# Patient Record
Sex: MALE | Race: BLACK OR AFRICAN AMERICAN | HISPANIC OR LATINO | Employment: OTHER | URBAN - METROPOLITAN AREA
[De-identification: names, ages, dates, MRNs, and addresses within clinical notes are randomized per-mention and may not be internally consistent; named-entity substitution may affect disease eponyms.]

---

## 2018-09-12 ENCOUNTER — OFFICE VISIT (OUTPATIENT)
Dept: FAMILY MEDICINE CLINIC | Facility: CLINIC | Age: 43
End: 2018-09-12
Payer: COMMERCIAL

## 2018-09-12 VITALS
SYSTOLIC BLOOD PRESSURE: 118 MMHG | HEART RATE: 83 BPM | TEMPERATURE: 97.6 F | OXYGEN SATURATION: 98 % | DIASTOLIC BLOOD PRESSURE: 80 MMHG | RESPIRATION RATE: 19 BRPM | WEIGHT: 148 LBS

## 2018-09-12 DIAGNOSIS — M75.102 TEAR OF LEFT ROTATOR CUFF, UNSPECIFIED TEAR EXTENT: Primary | ICD-10-CM

## 2018-09-12 PROBLEM — F41.9 ANXIETY: Status: ACTIVE | Noted: 2018-01-31

## 2018-09-12 PROBLEM — M25.512 ACUTE PAIN OF LEFT SHOULDER: Status: ACTIVE | Noted: 2018-03-13

## 2018-09-12 PROBLEM — M75.02 ADHESIVE CAPSULITIS OF LEFT SHOULDER: Status: ACTIVE | Noted: 2018-03-13

## 2018-09-12 PROBLEM — F32.A DEPRESSION: Status: ACTIVE | Noted: 2018-01-31

## 2018-09-12 PROCEDURE — 3725F SCREEN DEPRESSION PERFORMED: CPT | Performed by: FAMILY MEDICINE

## 2018-09-12 PROCEDURE — 99213 OFFICE O/P EST LOW 20 MIN: CPT | Performed by: FAMILY MEDICINE

## 2018-09-12 RX ORDER — PHENYTOIN 125 MG/5ML
SUSPENSION ORAL 3 TIMES DAILY
COMMUNITY
End: 2018-09-14 | Stop reason: CLARIF

## 2018-09-12 RX ORDER — ESCITALOPRAM OXALATE 20 MG/1
TABLET ORAL EVERY 24 HOURS
COMMUNITY

## 2018-09-12 NOTE — PROGRESS NOTES
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        There are no diagnoses linked to this encounter  Subjective:      Patient ID: Catrina Joe is a 44 y o  male  This is a very pleasant 42-year-old gentleman with a significant past medical history of seizures, depression, anxiety  Patient is being seen in the office for evaluation of left shoulder status post having surgery for his rotator cuff  At this time patient has significantly  Limited range of motion as well as pain when utilizing his left shoulder  I reviewed notes from his surgeon  Foster Marvin,   Who states   It is too early to do any heavy lifting  Given the nature of this patient's job, there is no opportunity for light duty  So I think would be appropriate to follow the recommendation of his orthopedic surgeon and hold off on working at this time, continue physical therapy and other recommendations by Orthopedics  The following portions of the patient's history were reviewed and updated as appropriate: allergies, current medications, past family history, past medical history, past social history, past surgical history and problem list     Review of Systems   Constitutional: Negative for chills and fever  HENT: Negative for congestion and sore throat  Eyes: Negative for visual disturbance  Respiratory: Negative for shortness of breath and wheezing  Cardiovascular: Negative for chest pain and palpitations  Gastrointestinal: Negative for nausea and vomiting  Endocrine: Negative for polydipsia and polyphagia  Genitourinary: Negative for discharge and dysuria  Musculoskeletal: Negative for arthralgias and myalgias  Decreased ROM of left shoulder  - tenderness on ROM activity     Restricted active and passive ROM   Skin: Negative for rash and wound     Neurological: Negative for syncope and facial asymmetry  Psychiatric/Behavioral: Negative for agitation  Objective:      /80 (BP Location: Left arm, Patient Position: Sitting, Cuff Size: Adult)   Pulse 83   Temp 97 6 °F (36 4 °C) (Temporal)   Resp 19   Wt 67 1 kg (148 lb)   SpO2 98%          Physical Exam   Constitutional: He is oriented to person, place, and time  He appears well-developed and well-nourished  No distress  HENT:   Head: Normocephalic and atraumatic  Nose: Nose normal    Mouth/Throat: Oropharynx is clear and moist    Eyes: Conjunctivae and EOM are normal  Pupils are equal, round, and reactive to light  Right eye exhibits no discharge  Left eye exhibits no discharge  Neck: Normal range of motion  No JVD present  No tracheal deviation present  No thyromegaly present  Cardiovascular: Normal rate, regular rhythm, normal heart sounds and intact distal pulses  Exam reveals no gallop and no friction rub  No murmur heard  Pulmonary/Chest: Effort normal and breath sounds normal  No respiratory distress  He has no wheezes  He exhibits no tenderness  Abdominal: He exhibits no distension and no mass  There is no tenderness  There is no rebound and no guarding  Musculoskeletal: He exhibits tenderness  He exhibits no edema or deformity  Decreased ROM of left shoulder  - Tenderness on ROM activity   - restricted active and passive ROM   Neurological: He is alert and oriented to person, place, and time  Skin: Skin is warm and dry  No rash noted  He is not diaphoretic  No erythema  No pallor  Psychiatric: He has a normal mood and affect   His behavior is normal  Judgment and thought content normal

## 2018-09-12 NOTE — ASSESSMENT & PLAN NOTE
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

## 2018-09-14 ENCOUNTER — APPOINTMENT (EMERGENCY)
Dept: RADIOLOGY | Facility: HOSPITAL | Age: 43
DRG: 053 | End: 2018-09-14
Payer: COMMERCIAL

## 2018-09-14 ENCOUNTER — HOSPITAL ENCOUNTER (INPATIENT)
Facility: HOSPITAL | Age: 43
LOS: 3 days | Discharge: HOME | DRG: 053 | End: 2018-09-17
Attending: EMERGENCY MEDICINE | Admitting: FAMILY MEDICINE
Payer: COMMERCIAL

## 2018-09-14 ENCOUNTER — APPOINTMENT (INPATIENT)
Dept: NON INVASIVE DIAGNOSTICS | Facility: HOSPITAL | Age: 43
DRG: 053 | End: 2018-09-14
Payer: COMMERCIAL

## 2018-09-14 DIAGNOSIS — Z91.14 NONCOMPLIANCE WITH MEDICATION REGIMEN: ICD-10-CM

## 2018-09-14 DIAGNOSIS — N17.9 AKI (ACUTE KIDNEY INJURY) (HCC): ICD-10-CM

## 2018-09-14 DIAGNOSIS — R10.9 ABDOMINAL PAIN: ICD-10-CM

## 2018-09-14 DIAGNOSIS — F14.10 COCAINE ABUSE (HCC): ICD-10-CM

## 2018-09-14 DIAGNOSIS — R56.9 SEIZURE (HCC): Primary | ICD-10-CM

## 2018-09-14 PROBLEM — F19.10 DRUG ABUSE (HCC): Status: ACTIVE | Noted: 2018-09-14

## 2018-09-14 PROBLEM — D72.829 LEUKOCYTOSIS: Status: ACTIVE | Noted: 2018-09-14

## 2018-09-14 PROBLEM — F31.9 BIPOLAR DEPRESSION (HCC): Status: ACTIVE | Noted: 2018-09-14

## 2018-09-14 LAB
ALBUMIN SERPL BCP-MCNC: 3.5 G/DL (ref 3.5–5)
ALP SERPL-CCNC: 89 U/L (ref 46–116)
ALT SERPL W P-5'-P-CCNC: 23 U/L (ref 12–78)
AMPHETAMINES SERPL QL SCN: NEGATIVE
APAP SERPL-MCNC: <2 UG/ML (ref 10–30)
AST SERPL W P-5'-P-CCNC: 20 U/L (ref 5–45)
ATRIAL RATE: 112 BPM
BACTERIA UR QL AUTO: ABNORMAL /HPF
BARBITURATES UR QL: NEGATIVE
BASE EX.OXY STD BLDV CALC-SCNC: 77.9 % (ref 60–80)
BASE EXCESS BLDV CALC-SCNC: -8.3 MMOL/L
BASOPHILS # BLD AUTO: 0.17 THOUSANDS/ΜL (ref 0–0.1)
BASOPHILS NFR BLD AUTO: 1 % (ref 0–1)
BENZODIAZ UR QL: POSITIVE
BILIRUB DIRECT SERPL-MCNC: 0.14 MG/DL (ref 0–0.2)
BILIRUB SERPL-MCNC: 0.39 MG/DL (ref 0.2–1)
BILIRUB UR QL STRIP: NEGATIVE
BUN SERPL-MCNC: 13 MG/DL (ref 5–25)
CALCIUM SERPL-MCNC: 9.6 MG/DL (ref 8.3–10.1)
CHLORIDE SERPL-SCNC: 101 MMOL/L (ref 100–108)
CK MB SERPL-MCNC: 1.9 NG/ML (ref 0–5)
CK MB SERPL-MCNC: <1 % (ref 0–2.5)
CK SERPL-CCNC: 373 U/L (ref 39–308)
CLARITY UR: CLEAR
CO2 SERPL-SCNC: <5 MMOL/L (ref 21–32)
COCAINE UR QL: POSITIVE
COLOR UR: YELLOW
COLOR, POC: YELLOW
CREAT SERPL-MCNC: 2 MG/DL (ref 0.6–1.3)
EOSINOPHIL # BLD AUTO: 0.69 THOUSAND/ΜL (ref 0–0.61)
EOSINOPHIL NFR BLD AUTO: 4 % (ref 0–6)
ERYTHROCYTE [DISTWIDTH] IN BLOOD BY AUTOMATED COUNT: 12.9 % (ref 11.6–15.1)
ETHANOL SERPL-MCNC: <3 MG/DL (ref 0–3)
ETHANOL SERPL-MCNC: <3 MG/DL (ref 0–3)
GFR SERPL CREATININE-BSD FRML MDRD: 40 ML/MIN/1.73SQ M
GLUCOSE SERPL-MCNC: 262 MG/DL (ref 65–140)
GLUCOSE UR STRIP-MCNC: ABNORMAL MG/DL
HCO3 BLDV-SCNC: 17 MMOL/L (ref 24–30)
HCT VFR BLD AUTO: 57.9 % (ref 36.5–49.3)
HGB BLD-MCNC: 17.5 G/DL (ref 12–17)
HGB UR QL STRIP.AUTO: ABNORMAL
HYALINE CASTS #/AREA URNS LPF: ABNORMAL /LPF
IMM GRANULOCYTES # BLD AUTO: 0.45 THOUSAND/UL (ref 0–0.2)
IMM GRANULOCYTES NFR BLD AUTO: 2 % (ref 0–2)
KETONES UR STRIP-MCNC: NEGATIVE MG/DL
LACTATE SERPL-SCNC: 2.2 MMOL/L (ref 0.5–2)
LACTATE SERPL-SCNC: 6.2 MMOL/L (ref 0.5–2)
LEUKOCYTE ESTERASE UR QL STRIP: NEGATIVE
LIPASE SERPL-CCNC: 207 U/L (ref 73–393)
LYMPHOCYTES # BLD AUTO: 7.37 THOUSANDS/ΜL (ref 0.6–4.47)
LYMPHOCYTES NFR BLD AUTO: 37 % (ref 14–44)
MCH RBC QN AUTO: 28.5 PG (ref 26.8–34.3)
MCHC RBC AUTO-ENTMCNC: 30.2 G/DL (ref 31.4–37.4)
MCV RBC AUTO: 94 FL (ref 82–98)
METHADONE UR QL: NEGATIVE
MONOCYTES # BLD AUTO: 1.07 THOUSAND/ΜL (ref 0.17–1.22)
MONOCYTES NFR BLD AUTO: 5 % (ref 4–12)
NEUTROPHILS # BLD AUTO: 10.1 THOUSANDS/ΜL (ref 1.85–7.62)
NEUTS SEG NFR BLD AUTO: 51 % (ref 43–75)
NITRITE UR QL STRIP: NEGATIVE
NON-SQ EPI CELLS URNS QL MICRO: ABNORMAL /HPF
NRBC BLD AUTO-RTO: 0 /100 WBCS
O2 CT BLDV-SCNC: 19 ML/DL
OPIATES UR QL SCN: NEGATIVE
P AXIS: 64 DEGREES
PCO2 BLDV: 35.4 MM HG (ref 42–50)
PCP UR QL: NEGATIVE
PH BLDV: 7.3 [PH] (ref 7.3–7.4)
PH UR STRIP.AUTO: 5.5 [PH] (ref 4.5–8)
PHENYTOIN SERPL-MCNC: <0.4 UG/ML (ref 10–20)
PLATELET # BLD AUTO: 364 THOUSANDS/UL (ref 149–390)
PMV BLD AUTO: 11.1 FL (ref 8.9–12.7)
PO2 BLDV: 48.4 MM HG (ref 35–45)
POTASSIUM SERPL-SCNC: 4.5 MMOL/L (ref 3.5–5.3)
PR INTERVAL: 136 MS
PROT SERPL-MCNC: 6.5 G/DL (ref 6.4–8.2)
PROT UR STRIP-MCNC: ABNORMAL MG/DL
QRS AXIS: 78 DEGREES
QRSD INTERVAL: 86 MS
QT INTERVAL: 302 MS
QTC INTERVAL: 412 MS
RBC # BLD AUTO: 6.15 MILLION/UL (ref 3.88–5.62)
RBC #/AREA URNS AUTO: ABNORMAL /HPF
SALICYLATES SERPL-MCNC: <3 MG/DL (ref 3–20)
SODIUM SERPL-SCNC: 136 MMOL/L (ref 136–145)
SP GR UR STRIP.AUTO: 1.02 (ref 1–1.03)
T WAVE AXIS: 31 DEGREES
THC UR QL: POSITIVE
TROPONIN I SERPL-MCNC: 0.06 NG/ML
TROPONIN I SERPL-MCNC: 0.1 NG/ML
TSH SERPL DL<=0.05 MIU/L-ACNC: 0.33 UIU/ML (ref 0.36–3.74)
UROBILINOGEN UR QL STRIP.AUTO: 0.2 E.U./DL
VENTRICULAR RATE: 112 BPM
WBC # BLD AUTO: 19.85 THOUSAND/UL (ref 4.31–10.16)
WBC #/AREA URNS AUTO: ABNORMAL /HPF

## 2018-09-14 PROCEDURE — 82550 ASSAY OF CK (CPK): CPT | Performed by: FAMILY MEDICINE

## 2018-09-14 PROCEDURE — 93005 ELECTROCARDIOGRAM TRACING: CPT

## 2018-09-14 PROCEDURE — 99222 1ST HOSP IP/OBS MODERATE 55: CPT | Performed by: FAMILY MEDICINE

## 2018-09-14 PROCEDURE — 74176 CT ABD & PELVIS W/O CONTRAST: CPT

## 2018-09-14 PROCEDURE — 71045 X-RAY EXAM CHEST 1 VIEW: CPT

## 2018-09-14 PROCEDURE — 96375 TX/PRO/DX INJ NEW DRUG ADDON: CPT

## 2018-09-14 PROCEDURE — 80329 ANALGESICS NON-OPIOID 1 OR 2: CPT | Performed by: EMERGENCY MEDICINE

## 2018-09-14 PROCEDURE — 82805 BLOOD GASES W/O2 SATURATION: CPT | Performed by: EMERGENCY MEDICINE

## 2018-09-14 PROCEDURE — 85025 COMPLETE CBC W/AUTO DIFF WBC: CPT | Performed by: EMERGENCY MEDICINE

## 2018-09-14 PROCEDURE — 83605 ASSAY OF LACTIC ACID: CPT | Performed by: EMERGENCY MEDICINE

## 2018-09-14 PROCEDURE — 93306 TTE W/DOPPLER COMPLETE: CPT

## 2018-09-14 PROCEDURE — 80307 DRUG TEST PRSMV CHEM ANLYZR: CPT | Performed by: EMERGENCY MEDICINE

## 2018-09-14 PROCEDURE — 70450 CT HEAD/BRAIN W/O DYE: CPT

## 2018-09-14 PROCEDURE — 80048 BASIC METABOLIC PNL TOTAL CA: CPT | Performed by: EMERGENCY MEDICINE

## 2018-09-14 PROCEDURE — 93306 TTE W/DOPPLER COMPLETE: CPT | Performed by: INTERNAL MEDICINE

## 2018-09-14 PROCEDURE — 96365 THER/PROPH/DIAG IV INF INIT: CPT

## 2018-09-14 PROCEDURE — 36415 COLL VENOUS BLD VENIPUNCTURE: CPT | Performed by: EMERGENCY MEDICINE

## 2018-09-14 PROCEDURE — 82553 CREATINE MB FRACTION: CPT | Performed by: FAMILY MEDICINE

## 2018-09-14 PROCEDURE — 84484 ASSAY OF TROPONIN QUANT: CPT | Performed by: EMERGENCY MEDICINE

## 2018-09-14 PROCEDURE — 99285 EMERGENCY DEPT VISIT HI MDM: CPT

## 2018-09-14 PROCEDURE — 84484 ASSAY OF TROPONIN QUANT: CPT | Performed by: FAMILY MEDICINE

## 2018-09-14 PROCEDURE — 80320 DRUG SCREEN QUANTALCOHOLS: CPT | Performed by: EMERGENCY MEDICINE

## 2018-09-14 PROCEDURE — 81001 URINALYSIS AUTO W/SCOPE: CPT

## 2018-09-14 PROCEDURE — 93010 ELECTROCARDIOGRAM REPORT: CPT | Performed by: INTERNAL MEDICINE

## 2018-09-14 PROCEDURE — 80076 HEPATIC FUNCTION PANEL: CPT | Performed by: EMERGENCY MEDICINE

## 2018-09-14 PROCEDURE — 83690 ASSAY OF LIPASE: CPT | Performed by: EMERGENCY MEDICINE

## 2018-09-14 PROCEDURE — 84443 ASSAY THYROID STIM HORMONE: CPT | Performed by: FAMILY MEDICINE

## 2018-09-14 PROCEDURE — 99255 IP/OBS CONSLTJ NEW/EST HI 80: CPT | Performed by: PSYCHIATRY & NEUROLOGY

## 2018-09-14 PROCEDURE — 80185 ASSAY OF PHENYTOIN TOTAL: CPT | Performed by: EMERGENCY MEDICINE

## 2018-09-14 RX ORDER — LORAZEPAM 2 MG/ML
2 INJECTION INTRAMUSCULAR EVERY 8 HOURS PRN
Status: DISCONTINUED | OUTPATIENT
Start: 2018-09-14 | End: 2018-09-17 | Stop reason: HOSPADM

## 2018-09-14 RX ORDER — NICOTINE 21 MG/24HR
1 PATCH, TRANSDERMAL 24 HOURS TRANSDERMAL DAILY
Status: DISCONTINUED | OUTPATIENT
Start: 2018-09-14 | End: 2018-09-17 | Stop reason: HOSPADM

## 2018-09-14 RX ORDER — ACETAMINOPHEN 325 MG/1
975 TABLET ORAL ONCE
Status: COMPLETED | OUTPATIENT
Start: 2018-09-14 | End: 2018-09-14

## 2018-09-14 RX ORDER — PHENYTOIN SODIUM 200 MG/1
400 CAPSULE, EXTENDED RELEASE ORAL 2 TIMES DAILY
COMMUNITY
End: 2019-08-15 | Stop reason: ALTCHOICE

## 2018-09-14 RX ORDER — ESCITALOPRAM OXALATE 20 MG/1
20 TABLET ORAL DAILY
Status: DISCONTINUED | OUTPATIENT
Start: 2018-09-14 | End: 2018-09-17 | Stop reason: HOSPADM

## 2018-09-14 RX ORDER — PHENYTOIN SODIUM 100 MG/1
400 CAPSULE, EXTENDED RELEASE ORAL EVERY 12 HOURS SCHEDULED
Status: DISCONTINUED | OUTPATIENT
Start: 2018-09-14 | End: 2018-09-17 | Stop reason: HOSPADM

## 2018-09-14 RX ORDER — SODIUM CHLORIDE, SODIUM GLUCONATE, SODIUM ACETATE, POTASSIUM CHLORIDE, MAGNESIUM CHLORIDE, SODIUM PHOSPHATE, DIBASIC, AND POTASSIUM PHOSPHATE .53; .5; .37; .037; .03; .012; .00082 G/100ML; G/100ML; G/100ML; G/100ML; G/100ML; G/100ML; G/100ML
1000 INJECTION, SOLUTION INTRAVENOUS ONCE
Status: COMPLETED | OUTPATIENT
Start: 2018-09-14 | End: 2018-09-14

## 2018-09-14 RX ORDER — PHENYTOIN SODIUM 100 MG/1
400 CAPSULE, EXTENDED RELEASE ORAL 2 TIMES DAILY
Status: DISCONTINUED | OUTPATIENT
Start: 2018-09-14 | End: 2018-09-14 | Stop reason: SDUPTHER

## 2018-09-14 RX ADMIN — SODIUM CHLORIDE 1000 MG PE: 9 INJECTION, SOLUTION INTRAVENOUS at 13:42

## 2018-09-14 RX ADMIN — ACETAMINOPHEN 975 MG: 325 TABLET, FILM COATED ORAL at 16:40

## 2018-09-14 RX ADMIN — SODIUM CHLORIDE, SODIUM GLUCONATE, SODIUM ACETATE, POTASSIUM CHLORIDE, MAGNESIUM CHLORIDE, SODIUM PHOSPHATE, DIBASIC, AND POTASSIUM PHOSPHATE 1000 ML: .53; .5; .37; .037; .03; .012; .00082 INJECTION, SOLUTION INTRAVENOUS at 09:05

## 2018-09-14 RX ADMIN — ACETAMINOPHEN 975 MG: 325 TABLET, FILM COATED ORAL at 22:47

## 2018-09-14 RX ADMIN — SODIUM CHLORIDE, SODIUM LACTATE, POTASSIUM CHLORIDE, AND CALCIUM CHLORIDE 1000 ML: .6; .31; .03; .02 INJECTION, SOLUTION INTRAVENOUS at 08:07

## 2018-09-14 RX ADMIN — PHENYTOIN SODIUM 400 MG: 100 CAPSULE, EXTENDED RELEASE ORAL at 22:35

## 2018-09-14 RX ADMIN — LEVETIRACETAM 2000 MG: 100 INJECTION, SOLUTION INTRAVENOUS at 07:41

## 2018-09-14 RX ADMIN — ESCITALOPRAM OXALATE 20 MG: 20 TABLET, FILM COATED ORAL at 15:59

## 2018-09-14 NOTE — PLAN OF CARE
CARDIOVASCULAR - ADULT     Maintains optimal cardiac output and hemodynamic stability Progressing     Absence of cardiac dysrhythmias or at baseline rhythm Progressing        DISCHARGE PLANNING     Discharge to home or other facility with appropriate resources Progressing        Knowledge Deficit     Patient/family/caregiver demonstrates understanding of disease process, treatment plan, medications, and discharge instructions Progressing        METABOLIC, FLUID AND ELECTROLYTES - ADULT     Electrolytes maintained within normal limits Progressing     Fluid balance maintained Progressing     Glucose maintained within target range Progressing        MUSCULOSKELETAL - ADULT     Maintain or return mobility to safest level of function Progressing     Maintain proper alignment of affected body part Progressing        NEUROSENSORY - ADULT     Achieves stable or improved neurological status Progressing     Absence of seizures Progressing     Remains free of injury related to seizures activity Progressing     Achieves maximal functionality and self care Progressing        PAIN - ADULT     Verbalizes/displays adequate comfort level or baseline comfort level Progressing        Potential for Falls     Patient will remain free of falls Progressing        Prexisting or High Potential for Compromised Skin Integrity     Skin integrity is maintained or improved Progressing        SAFETY ADULT     Maintain or return to baseline ADL function Progressing     Maintain or return mobility status to optimal level Progressing
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
## 2018-09-14 NOTE — ASSESSMENT & PLAN NOTE
- patient self- discontinued all medication except lexapro 20 mg with non-compliance  - consider psych evaluation

## 2018-09-14 NOTE — ED ATTENDING ATTESTATION
Sara Castro DO, saw and evaluated the patient  I have discussed the patient with the resident/non-physician practitioner and agree with the resident's/non-physician practitioner's findings, Plan of Care, and MDM as documented in the resident's/non-physician practitioner's note, except where noted  All available labs and Radiology studies were reviewed  At this point I agree with the current assessment done in the Emergency Department  I have conducted an independent evaluation of this patient a history and physical is as follows:      45 yo male with hx of epilepsy on dilantin, reportedly compliant, as well as bipolar presents for multiple seizures today, longest lasting 10 minutes  Had tonic clonic generalized seizure en route per EMS, received 3 5mg versed with cessation of seizure activity  Pt currently post ictal and agitated  DIAZ  Tongue laceration noted  Unable to obtain hx from pt at this time due to being encephalopathic - likely post ictal state  Imp: multiple seizures, tongue laceration  Plan: no active bleeding, allow to heal by secondary intention  Load with keppra, labs for breakthrough seizure, ECG, CT head, dilantin level  Will require admission for further eval and tx of multiple breakthrough seizures        Critical Care Time  CritCare Time    Procedures

## 2018-09-14 NOTE — LETTER
To Whom It May Concern:  Mr Jaqueline Bradford is a 44 y o  male is under my care  He will be unable to attend to his daily obligations due to his medical condition, which is expected to last through Sunday September the 23rd 2018  I appreciate you kindly accommodating him during this time  Please contact me at the coordinates provided above with any further questions       Sincerely,      Makenzie Mcguire MD

## 2018-09-14 NOTE — ED NOTES
Dr Rose notified of CO2 <5 and additional blood gas to be drawn on pt    RN aware     Enid Loco, JILLIAN  09/14/18 [id number]

## 2018-09-14 NOTE — ED NOTES
Patient calm, cooperative and removed from restraints  Patient taken to CT       Macie Potter RN  09/14/18 9854

## 2018-09-14 NOTE — CASE MANAGEMENT
Thank you,  [address]  Utilization Review Department  Phone: [phone]; Fax [phone]  ATTENTION: Please call with any questions or concerns to [phone]  and carefully follow the prompts so that you are directed to the right person  Send all requests for admission clinical reviews, approved or denied determinations and any other requests to fax [phone]  All voicemails are confidential    Initial Clinical Review    Admission: Date/Time/Statement: 9/14/18 @ 0923     Orders Placed This Encounter   Procedures    Inpatient Admission (expected length of stay for this patient is greater than two midnights)     Standing Status:   Standing     Number of Occurrences:   1     Order Specific Question:   Admitting Physician     Answer:   Huy Mcclain     Order Specific Question:   Level of Care     Answer:   Med Surg [16]     Order Specific Question:   Estimated length of stay     Answer:   More than 2 Midnights     Order Specific Question:   Certification     Answer:   I certify that inpatient services are medically necessary for this patient for a duration of greater than two midnights  See H&P and MD Progress Notes for additional information about the patient's course of treatment  ED: Date/Time/Mode of Arrival:   ED Arrival Information     Expected Arrival Acuity Means of Arrival Escorted By Service Admission Type    9/14/2018 06:30 9/14/2018 06:35 [facility]gent [facility] General Medicine Emergency    Arrival Complaint    Seizure          Chief Complaint:   Chief Complaint   Patient presents with    Seizure - Prior Hx Of     per EMS report patient has had multple grandmal seizures  Pt post-ictal and combative at this time  vitals stable       History of Illness: 44 y o  male with a history of drug abuse, bipolar depression and seizure disorder who presents with mulitple witnessed seziure   The patient's mother reports he woke up with morning with garbled non-intelligent speech and then had multiple episodes of eyes rolling back, becoming stiff and full body shaking, the longest lasting about 7 minutes  The patient reports over the past few days he hasn't been feeling well with abdominal pain and heartburn, with episodes of feeling faint at times  Due to this he has been not having good po intake and also stopped taking his dilantin  He reports mariajuana use, denies daily use and denies cocaine use despite positive UDS  To note, in a previous admission he admits to [facility] was "laced with cocaine "     ED Vital Signs:   ED Triage Vitals   Temperature Pulse Respirations Blood Pressure SpO2   09/14/18 0818 09/14/18 0652 09/14/18 0652 09/14/18 0745 09/14/18 0652   (!) 97 3 °F (36 3 °C) (!) 115 20 124/77 98 %      Temp Source Heart Rate Source Patient Position - Orthostatic VS BP Location FiO2 (%)   09/14/18 0818 09/14/18 0652 09/14/18 0745 09/14/18 0745 --   Tympanic Monitor Lying Right arm       Pain Score       09/14/18 0745       No Pain        Wt Readings from Last 1 Encounters:   09/14/18 70 kg (154 lb 5 2 oz)       Vital Signs (abnormal): Abnormal Labs/Diagnostic Test Results: trop 0 10---lactic acid 6 2-2 2  Urine drug screen + benzos + cocaine and + thc  Venous bg-- pc02 35 4--p02 48  4--hc03 17 0  c02 <5--cr 2 00--bs 262  phenytoiin <0 04--wbc 19 85--hgb 17 5/57 9    ekg-Sinus tachycardia  Right atrial enlargement  Borderline ECG    ED Treatment:   Medication Administration from 09/14/2018 0635 to 09/14/2018 1400       Date/Time Order Dose Route Action Action by Comments     09/14/2018 0741 levETIRAcetam (KEPPRA) 2,000 mg in sodium chloride 0 9 % 250 mL IVPB 2,000 mg Intravenous Given Macie Potter RN      09/14/2018 1036 lactated ringers bolus 1,000 mL 0 mL Intravenous Stopped Macie Potter RN      09/14/2018 6601 lactated ringers bolus 1,000 mL 1,000 mL Intravenous New 2309 Nancy Lloyd RN      09/14/2018 1036 multi-electrolyte (ISOLYTE-S PH 7 4) bolus 1,000 mL 0 mL Intravenous Stopped Amaya Urena RN      09/14/2018 8941 multi-electrolyte (ISOLYTE-S PH 7 4) bolus 1,000 mL 1,000 mL Intravenous New Bag Amaya Urena RN      09/14/2018 1342 fosphenytoin (CEREBYX) 1,000 mg PE in sodium chloride 0 9 % 100 mL IVPB bolus 1,000 mg PE Intravenous New Bag [address], RN           Past Medical/Surgical History:    Active Ambulatory Problems     Diagnosis Date Noted    Left rotator cuff tear 09/12/2018    Acute pain of left shoulder 03/13/2018    Adhesive capsulitis of left shoulder 03/13/2018    Anxiety 01/31/2018    Depression 01/31/2018     Resolved Ambulatory Problems     Diagnosis Date Noted    No Resolved Ambulatory Problems     Past Medical History:   Diagnosis Date    Bipolar 1 disorder (HCC)     [facility]ia [facility] [facility]     [facility]izures [facility]        Admitting Diagnosis: Seizure ([place] [address] ) [R56 9]  Cocaine abuse [F14 10]  Abdominal pain [R10 9]  Noncompliance with medication regimen [Z91 14]  ARTHUR (acute kidney injury) ([facility] ) [N17 9]    Age/Sex: 44 y o  male    Assessment/Plan: family reports 7 witnessed seizures, longest lasting 7 minutes, patient reports non compliance with medication for at least a few days   - UDS + cocaine, patient reports P[place] use, denies cocaine use however on previous admisison it is documented that marajuana laced with cocaine   - take phenytoin 400 mg BID at home, history of noncompliance, phenytoin level non-detectable on this admission  Neurology consulted  Leukocytosis   Assessment & Plan     - likely stress response from multiple tonic clonic seizures     ARTHUR (acute kidney injury) ([facility] )   Assessment & Plan     - creatinine 2 00, will give IV hydration and recheck BMP tomorrow, likely prerenal due to dehydration   -baseline cr 1 15 -1 23     Bipolar depression ([facility] [address] )   Assessment & Plan     - patient self- discontinued all medication except lexapro 20 mg with non-compliance  - consider psych evaluation      Drug abuse   Assessment & Plan     UDS positive for cocaine and marajuana           Admission Orders:  Scheduled Meds:   Current Facility-Administered Medications:  EMS replenish medication  Does not apply Once Nakina Southerly, DO   EMS replenish medication  Does not apply Once Nakina Southerly, DO   escitalopram 20 mg Oral Daily Jaz Ramirez, DO   LORazepam 2 mg Intravenous Q8H PRN Eddi Jiménez, DO   nicotine 1 patch Transdermal Daily Jaz Ramirez, DO   phenytoin 400 mg Oral Q12H [facility] Fiorella Guillory PA-C     Continuous Infusions:    PRN Meds: LORazepam     Neuro checks q4  Seizure precautions  Ambulate q shift  telm  Reg diet  Consult neurology    Progress notes 9/15  Seizure free since admission now on home phenytoin dose  Will check level in AM   More concerning is his ARTHUR that worsened overnight, may be due to circulating CPK and prerenal state  Will resuscitate with IVF and repeat in AM   Patient much more alert and normally conversive with me today  Cr 2 99---wbc 13 47    9/16  Leukocytosis   Assessment & Plan     Improving, likely stress response from multiple tonic clonic seizures  WBC 13 47 on 9/15  No signs or symptoms of infection, urine microscopy negative, CT abdomen and chest x-ray negative  Lactic acidosis trended down                ARTHUR (acute kidney injury) ([facility] [address] )   Assessment & Plan     Baseline creatinine 1 15-1 23, improving today on IVF to 2 43  Good urine output, +425cc(3675/3250)  Creatinine 2 00 on admission, increased to 2 99 on 09/15, likely related to increased CPK related to seizure activity    Patient given aggressive IV fluid hydration With normal saline at 150 cc an hour  -repeat BMP pending this morning

## 2018-09-14 NOTE — ED PROVIDER NOTES
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 on Dilantin, bipolar disorder, drug abuse, who presents with multiple seizures  The patient's family apparently witnessed 7 separate tonic-clonic seizures this morning, and called 911  Upon EMS arrival, the patient was noted to be in the mid tonic-clonic seizure at that time, and was given 3 5 milligrams of Versed which ended the seizure episode  The patient was noted to be significantly confused, and was also noted to have a tongue laceration prior to arrival   On arrival, the patient continued to be confused  As the patient mental status improved, he did state over the last 3 days he had been noncompliant with his Dilantin due to generally not feeling well  The patient also endorses abdominal pain, associated with nausea while not feeling well  He denies any fevers, chills, chest pain, shortness of breath, diarrhea, urinary symptoms  He denies any drug use or alcohol use recently  Prior to Admission Medications   Prescriptions Last Dose Informant Patient Reported? Taking?   escitalopram (LEXAPRO) 20 mg tablet Past Week at Unknown time  Yes Yes   Sig: every 24 hours   phenytoin extended (DILANTIN) 200 MG ER capsule Past Week at Unknown time  Yes Yes   Sig: Take 400 mg by mouth 2 (two) times a day      Facility-Administered Medications: None       Past Medical History:   Diagnosis Date    Bipolar 1 [facility] ([facility] [address] )     [facility]     Seizures ([facility] )        History reviewed  No pertinent surgical history  History reviewed  No pertinent family history  I have reviewed and agree with the history as documented      Social History   Substance Use Topics    Smoking status: Current Every Day Smoker    Smokeless tobacco: Current User    Alcohol use No        Review of Systems   Constitutional: Negative for chills and fever  HENT: Negative for congestion and sinus pain  Eyes: Negative for photophobia and visual disturbance  Respiratory: Negative for cough and shortness of breath  Cardiovascular: Negative for chest pain and palpitations  Gastrointestinal: Positive for abdominal pain and nausea  Negative for diarrhea and vomiting  Genitourinary: Negative for dysuria and hematuria  Musculoskeletal: Negative for neck pain and neck stiffness  Skin: Negative for pallor and rash  Neurological: Positive for seizures and weakness  Negative for light-headedness and headaches  Physical Exam  ED Triage Vitals   Temperature Pulse Respirations Blood Pressure SpO2   09/14/18 0818 09/14/18 0652 09/14/18 0652 09/14/18 0745 09/14/18 0652   (!) 97 3 °F (36 3 °C) (!) 115 20 124/77 98 %      Temp Source Heart Rate Source Patient Position - Orthostatic VS BP Location FiO2 (%)   09/14/18 0818 09/14/18 0652 09/14/18 0745 09/14/18 0745 --   Tympanic Monitor Lying Right arm       Pain Score       09/14/18 0745       No Pain           Orthostatic Vital Signs  Vitals:    09/15/18 0740 09/15/18 1213 09/15/18 2200 09/16/18 0755   BP: 115/67 120/74 115/60 116/71   Pulse: (!) 53 61 59 62   Patient Position - Orthostatic VS: Lying Lying Lying Lying       Physical Exam   Constitutional: He is oriented to person, place, and time  GCS is 13  Confused appearing, but nontoxic in appearance  No increased respiratory effort  HENT:   Head: Normocephalic  Mouth/Throat: Oropharynx is clear and moist  No oropharyngeal exudate  Dried blood in the oropharynx, associated with Laceration to the tongue with no active bleeding   Eyes: Pupils are equal, round, and reactive to light  No scleral icterus  Neck: Normal range of motion  No JVD present     Cardiovascular: Normal rate, regular rhythm and normal heart sounds  No murmur heard  Pulmonary/Chest: Effort normal  No respiratory distress  He has no wheezes  He has no rales  Abdominal:   Abdomen is mildly tender to palpation diffusely, without rebound, guarding, or distention   Musculoskeletal: Normal range of motion  He exhibits no edema  Neurological: He is alert and oriented to person, place, and time  GCS is 5-4-4  Strength is 5/5 in all extremities bilaterally  Sensation grossly intact in all extremities  No myoclonus appreciated   Skin: Skin is warm and dry  No rash noted  No pallor         ED Medications  Medications    EMS REPLENISHMENT MED ( Does not apply Hold 9/14/18 1525)    EMS REPLENISHMENT MED ( Does not apply Hold 9/14/18 1525)   escitalopram (LEXAPRO) tablet 20 mg (20 mg Oral Given 9/16/18 0826)   nicotine (NICODERM CQ) 14 mg/24hr TD 24 hr patch 1 patch (1 patch Transdermal Not Given 9/16/18 0826)   LORazepam (ATIVAN) 2 mg/mL injection 2 mg (not administered)   phenytoin (DILANTIN) ER capsule 400 mg (400 mg Oral Given 9/16/18 0826)   sodium chloride 0 9 % infusion (150 mL/hr Intravenous New Bag 9/16/18 0831)   acetaminophen (TYLENOL) tablet 975 mg (975 mg Oral Given 9/15/18 1346)   sodium chloride 0 9 % bolus 1,000 mL (1,000 mL Intravenous New Bag 9/16/18 1418)   levETIRAcetam (KEPPRA) 2,000 mg in sodium chloride 0 9 % 250 mL IVPB (2,000 mg Intravenous Given 9/14/18 0741)   lactated ringers bolus 1,000 mL (0 mL Intravenous Stopped 9/14/18 1036)   multi-electrolyte (ISOLYTE-S PH 7 4) bolus 1,000 mL (0 mL Intravenous Stopped 9/14/18 1036)   fosphenytoin (CEREBYX) 1,000 mg PE in sodium chloride 0 9 % 100 mL IVPB bolus (1,000 mg PE Intravenous New Bag 9/14/18 1342)   acetaminophen (TYLENOL) tablet 975 mg (975 mg Oral Given 9/14/18 1640)   acetaminophen (TYLENOL) tablet 975 mg (975 mg Oral Given 9/14/18 2247)       Diagnostic Studies  Results Reviewed     Procedure Component Value Units Date/Time    Hemoglobin A1C w/ EAG Estimation [[id number]] Collected:  09/15/18 0509    Lab Status:  Final result Specimen:  Blood from Hand, Left Updated:  09/15/18 0757     Hemoglobin A1C 5 3 %       mg/dl     CKMB [[id number]]  (Normal) Collected:  09/14/18 1035    Lab Status:  Final result Specimen:  Blood from Arm, Left Updated:  09/14/18 1651     CK-MB Index <1 0 %      CK-MB FRACTION 1 9 ng/mL     Troponin I [[id number]]  (Abnormal) Collected:  09/14/18 1605    Lab Status:  Final result Specimen:  Blood from Arm, Left Updated:  09/14/18 1647     Troponin I 0 06 (H) ng/mL     CK (with reflex to MB) [[id number]]  (Abnormal) Collected:  09/14/18 1035    Lab Status:  Final result Specimen:  Blood from Arm, Left Updated:  09/14/18 1627     Total  (H) U/L     Hepatic function panel [[id number]]  (Normal) Collected:  09/14/18 1035    Lab Status:  Final result Specimen:  Blood from Arm, Left Updated:  09/14/18 1319     Total Bilirubin 0 39 mg/dL      Bilirubin, Direct 0 14 mg/dL      Alkaline Phosphatase 89 U/L      AST 20 U/L      ALT 23 U/L      Total Protein 6 5 g/dL      Albumin 3 5 g/dL     Troponin I [[id number]]  (Abnormal) Collected:  09/14/18 1232    Lab Status:  Final result Specimen:  Blood from Arm, Left Updated:  09/14/18 1301     Troponin I 0 10 (H) ng/mL     POCT urinalysis dipstick [[id number]]  (Normal) Resulted:  09/14/18 0825    Lab Status:  Final result Updated:  09/14/18 1231     Color, UA yellow    Lipase [[id number]]  (Normal) Collected:  09/14/18 1035    Lab Status:  Final result Specimen:  Blood from Arm, Left Updated:  09/14/18 1110     Lipase 207 u/L     Lactic acid, plasma [[id number]]  (Abnormal) Collected:  09/14/18 1035    Lab Status:  Final result Specimen:  Blood from Arm, Left Updated:  09/14/18 1108     LACTIC ACID 2 2 (HH) mmol/L     Narrative:         Result may be elevated if tourniquet was used during collection      Rapid drug screen, urine [[id number]]  (Abnormal) Collected:  09/14/18 0833    Lab Status:  Final result Specimen:  Urine from Urine, Clean Catch Updated:  09/14/18 0915     Amph/Meth UR Negative     Barbiturate Ur Negative     Benzodiazepine Urine Positive (A)     Cocaine Urine Positive (A)     Methadone Urine Negative     Opiate Urine Negative     PCP Ur Negative     THC Urine Positive (A)    Narrative:         Presumptive report  If requested, specimen will be sent to reference lab for confirmation  FOR MEDICAL PURPOSES ONLY  IF CONFIRMATION NEEDED PLEASE CONTACT THE LAB WITHIN 5 DAYS  Drug Screen Cutoff Levels:  AMPHETAMINE/METHAMPHETAMINES  1000 ng/mL  BARBITURATES     200 ng/mL  BENZODIAZEPINES     200 ng/mL  COCAINE      300 ng/mL  METHADONE      300 ng/mL  OPIATES      300 ng/mL  PHENCYCLIDINE     25 ng/mL  THC       50 ng/mL    Acetaminophen level [[id number]]  (Abnormal) Collected:  09/14/18 0833    Lab Status:  Final result Specimen:  Blood from Arm, Left Updated:  09/14/18 0903     Acetaminophen Level <2 (L) ug/mL     Salicylate level [[id number]]  (Abnormal) Collected:  09/14/18 0833    Lab Status:  Final result Specimen:  Blood from Arm, Left Updated:  97/05/41 0456     Salicylate Lvl <3 (L) mg/dL     Ethanol [[id number]]  (Normal) Collected:  09/14/18 0833    Lab Status:  Final result Specimen:  Blood from Arm, Left Updated:  09/14/18 0859     Ethanol Lvl <3 mg/dL     Urine Microscopic [[id number]]  (Abnormal) Collected:  09/14/18 0824    Lab Status:  Final result Specimen:  Urine from Urine, Clean Catch Updated:  09/14/18 0851     RBC, UA None Seen /hpf      WBC, UA None Seen /hpf      Epithelial Cells None Seen /hpf      Bacteria, UA None Seen /hpf      Hyaline Casts, UA 3-5 (A) /lpf     Lactic acid, plasma [[id number]]  (Abnormal) Collected:  09/14/18 0749    Lab Status:  Final result Specimen:  Blood from Hand, Right Updated:  09/14/18 0830     LACTIC ACID 6 2 (HH) mmol/L     Narrative:         Result may be elevated if tourniquet was used during collection      Blood gas, venous [[id number]]  (Abnormal) Collected:  09/14/18 1070    Lab Status:  Final result Specimen:  Blood from Arm, Left Updated:  09/14/18 0825     pH, Pete 7 300     pCO2, Pete 35 4 (L) mm Hg      pO2, Pete 48 4 (H) mm Hg      HCO3, Pete 17 0 (L) mmol/L      Base Excess, Pete -8 3 mmol/L      O2 Content, Pete 19 0 ml/dL      O2 HGB, VENOUS 77 9 %     Narrative: Therapeutic levels (1 mg/mL and 2 mg/mL) of hydroxocobalamin may interfere with the fCOHb and fMetHb where it may cause lower than expected values    ED Urine Macroscopic [[id number]]  (Abnormal) Collected:  09/14/18 0824    Lab Status:  Final result Specimen:  Urine Updated:  09/14/18 0825     Color, UA Yellow     Clarity, UA Clear     pH, UA 5 5     Leukocytes, UA Negative     Nitrite, UA Negative     Protein,  (2+) (A) mg/dl      Glucose,  (1/10%) (A) mg/dl      Ketones, UA Negative mg/dl      Urobilinogen, UA 0 2 E U /dl      Bilirubin, UA Negative     Blood, UA Moderate (A)     Specific [place], UA 1 025    Narrative:       CLINITEK RESULT    Basic metabolic panel [[id number]]  (Abnormal) Collected:  09/14/18 0658    Lab Status:  Final result Specimen:  Blood from Arm, Right Updated:  09/14/18 0806     Sodium 136 mmol/L      Potassium 4 5 mmol/L      Chloride 101 mmol/L      CO2 <5 (LL) mmol/L      ANION GAP -- mmol/L      BUN 13 mg/dL      Creatinine 2 00 (H) mg/dL      Glucose 262 (H) mg/dL      Calcium 9 6 mg/dL      eGFR 40 ml/min/1 73sq m     Narrative:         National Kidney Disease Education Program recommendations are as follows:  GFR calculation is accurate only with a steady state creatinine  Chronic Kidney disease less than 60 ml/min/1 73 sq  meters  Kidney failure less than 15 ml/min/1 73 sq  meters      Phenytoin level, total [[id number]]  (Abnormal) Collected:  09/14/18 0658    Lab Status:  Final result Specimen:  Blood from Arm, Right Updated:  09/14/18 0738     Phenytoin Lvl <0 4 (L) ug/mL     Ethanol [[id number]]  (Normal) Collected:  09/14/18 0658    Lab Status:  Final result Specimen:  Blood from Arm, Right Updated:  09/14/18 0730     Ethanol Lvl <3 mg/dL     CBC and differential [[id number]]  (Abnormal) Collected:  09/14/18 0658    Lab Status:  Final result Specimen:  Blood from Arm, Right Updated:  09/14/18 0711     WBC 19 85 (H) Thousand/uL      RBC 6 15 (H) Million/uL      Hemoglobin 17 5 (H) g/dL      Hematocrit 57 9 (H) %      MCV 94 fL      MCH 28 5 pg      MCHC 30 2 (L) g/dL      RDW 12 9 %      MPV 11 1 fL      Platelets 116 Thousands/uL      nRBC 0 /100 WBCs      Neutrophils Relative 51 %      Immat GRANS % 2 %      Lymphocytes Relative 37 %      Monocytes Relative 5 %      Eosinophils Relative 4 %      Basophils Relative 1 %      Neutrophils Absolute 10 10 (H) Thousands/µL      Immature Grans Absolute 0 45 (H) Thousand/uL      Lymphocytes Absolute 7 37 (H) Thousands/µL      Monocytes Absolute 1 07 Thousand/µL      Eosinophils Absolute 0 69 (H) Thousand/µL      Basophils Absolute 0 17 (H) Thousands/µL                  XR chest portable   Final Result by Eileen Crawford MD (09/14 1117)      No acute cardiopulmonary disease  Workstation performed: [id number]         CT abdomen pelvis wo contrast   Final Result by Kavon Clifford MD (09/14 0076)      No acute pathology  Workstation performed: [id number]         CT head without contrast   Final Result by Carlyn Bryant DO (09/14 9845)   1  No intracranial hemorrhage or calvarial fracture  No acute intracranial abnormality  2   Stable bifrontal encephalomalacia and gliosis, likely posttraumatic  3   Prominence of the temporal horns of the lateral ventricles bilaterally  Correlate for mesial temporal sclerosis versus prior temporal lobe trauma                    Workstation performed: [id number]               Procedures  Procedures      Phone Consults  ED Phone Contact    ED Course                               MDM  Number of Diagnoses or Management Options  Abdominal pain:   ARTHUR (acute kidney injury) [facility]):   Cocaine abuse:   Noncompliance with medication regimen:   Seizure [facility]):   Diagnosis management comments: This is a 46-year-old male who presents status post multiple seizure episodes, which terminated with Versed administered by EMS  On arrival, the patient appears to be postictal   He is otherwise afebrile, hemodynamically stable  His exam is noted above, and specifically the patient  Has a laceration to the tongue consistent with seizure episode, and does display mild tenderness of the abdomen, but an otherwise unremarkable exam, including a normal neuro exam   - will check CBC, CMP, lactic  Will also check Dilantin 1  - head CT   Based on the patient's abdominal exam, will also check a CT of the abdomen and pelvis  - will load with Keppra  - chest x-ray, EKG  - plan to re-evaluate  Likely admission    ED COURSE    - lab work and Imaging reviewed  Head CT unremarkable  Lab work concerning for metabolic acidosis, and VBG was checked, which is consistent with mild metabolic acidosis which appears to be improving  Initial lactic acid was 6 2, consistent with seizure episode  Leukocytosis may also be secondary to seizure episode  -  Urinalysis positive for cocaine abuse  Also of note, the patient's Dilantin level is significantly subtherapeutic  - on re-evaluation, the patient's mental status significantly improved  He now is well-appearing, and has normal vital signs  Plans for admission  I discussed this plan with the patient's family members, they are agreeable    I discussed this case with the family medicine resident    CritCare Time    Disposition  Final diagnoses:   Seizure ([address] )   ARTHUR (acute kidney injury) ([facility] [address] )   Cocaine abuse   Noncompliance with medication regimen   Abdominal pain     Time reflects when diagnosis was documented in both MDM as applicable and the Disposition within this note     Time User Action Codes Description Comment    9/14/2018  9:23 AM Sergio Tovar [R56 9] [facility] ([facility] )     9/14/2018  9:23 AM Oscar Griffins Add [N17 9] ARTHUR (acute kidney injury) ([facility] )     9/14/2018  9:23 AM Oscar Griffins Add [F14 10] Cocaine abuse     9/14/2018  9:23 AM Oscar Griffins Add [Z91 14] Noncompliance with medication regimen     9/14/2018  9:23 AM Oscar Griffins Add [R10 9] Abdominal pain       ED Disposition     ED Disposition Condition Comment    Admit  Case was discussed with Family Medicine and the patient's admission status was agreed to be Admission Status: inpatient status to the service of Dr Jarrod Farrell   Follow-up Information     Follow up With Specialties Details Why Contact Info    Lv Neurology Neuro psychiatry Follow up Please call your neurology team and ask to have a follow-up visit within the next few weeks after your discharge  [address] [place] [address]            Current Discharge Medication List      CONTINUE these medications which have NOT CHANGED    Details   escitalopram (LEXAPRO) 20 mg tablet every 24 hours      phenytoin extended (DILANTIN) 200 MG ER capsule Take 400 mg by mouth 2 (two) times a day           No discharge procedures on file  ED Provider  Attending physically available and evaluated Jacques Alda BOWMAN managed the patient along with the ED Attending      Electronically Signed by         Jennifer Hansen MD  09/16/18 3991

## 2018-09-14 NOTE — ASSESSMENT & PLAN NOTE
- likely stress response from multiple tonic clonic seizures  - no signs or symptoms of infection, urine microscopic negative, CT abdomen and Chest XRAY negative   - will continue to monitor clinically and monitor for fever   - lactic acid 6 2, will check repeat

## 2018-09-14 NOTE — ASSESSMENT & PLAN NOTE
- creatinine 2 00, will give IV hydration and recheck BMP tomorrow, likely prerenal   -baseline cr 1 15 -1 23

## 2018-09-14 NOTE — ASSESSMENT & PLAN NOTE
- family reports 7 witnessed seizures, longest lasting 7 minutes, patient reports non compliance with medication for at least a few days   - UDS + cocaine, patient reports Elsa Winfield use, denies cocaine use however on previous admisison it is documented that freya laced with cocaine   - take phenytoin 400 mg BID at home, history of noncompliance, phenytoin level non-detectable on this admission  - witnessed tonic clonic seizure in EMS prehospital given 3 5 mg of versed  - Neurology consulted, appreciate recommendations will resume home phenytoin 400 mg BID for now   - leukocytosis 19, may be a stress reaction, will monitor for fever, urinalysis and chest XRAY negative, as well as negative CT abdomen and pelvis therefore unlikely infectious etiology contributing  - CT head: no acute intracranial abnormality, signs of previous temporal lobe trauma

## 2018-09-14 NOTE — H&P
H&P Note - Christofer Urbano 1975, 44 y o  male MRN: [id number]    Unit/Bed#: ED 20 Encounter: [id number]    Primary Care Provider: Melissa Matt MD   Date and time admitted to hospital: 9/14/2018  6:35 AM    Assessment/Plan:   * Seizure [facility])   Assessment & Plan    - family reports 7 witnessed seizures, longest lasting 7 minutes, patient reports non compliance with medication for at least a few days   - UDS + cocaine, patient reports Deneen Irene use, denies cocaine use however on previous admisison it is documented that freya laced with cocaine   - take phenytoin 400 mg BID at home, history of noncompliance, phenytoin level non-detectable on this admission  - witnessed tonic clonic seizure in EMS prehospital given 3 5 mg of versed  - Neurology consulted, appreciate recommendations will resume home phenytoin 400 mg BID for now, after loading dose of phenytoin will need telemetry monitoring    - leukocytosis 19, may be a stress reaction, will monitor for fever, urinalysis and chest XRAY negative, as well as negative CT abdomen and pelvis therefore unlikely infectious etiology contributing  - CT head: no acute intracranial abnormality, signs of previous temporal lobe trauma            Leukocytosis   Assessment & Plan    - likely stress response from multiple tonic clonic seizures  - no signs or symptoms of infection, urine microscopic negative, CT abdomen and Chest XRAY negative   - will continue to monitor clinically and monitor for fever   - lactic acid 6 2 then 2 2 likely secondary to prolonged seizure of 7 minutes with muscle breakdown         ARTHUR (acute kidney injury) ([facility] [address] )   Assessment & Plan    - creatinine 2 00, will give IV hydration and recheck BMP tomorrow, likely prerenal due to dehydration   -baseline cr 1 15 -1 23        Bipolar depression (HCC)   Assessment & Plan    - patient self- discontinued all medication except lexapro 20 mg with non-compliance  - consider psych evaluation         Drug abuse Assessment & Plan    UDS positive for cocaine and marajuana, likely contributing to breakthrough seizure      PPX: early ambulation   Diet: NPO while somnolent   Code Status: Level 1 full code   Dispo: inpatient admission, appreciate neurology recommendations  Discussed Plan with Dr Fadia Arciniega and [facility] team who agree with the assessment and plan  History of Present Illness   HPI:  Shanda Lang is a 44 y o  male with a history of drug abuse, bipolar depression and seizure disorder who presents with mulitple witnessed seziure  The patient's mother reports he woke up with morning with garbled non-intelligent speech and then had multiple episodes of eyes rolling back, becoming stiff and full body shaking, the longest lasting about 7 minutes  The patient reports over the past few days he hasn't been feeling well with abdominal pain and heartburn, with episodes of feeling faint at times  Due to this he has been not having good po intake and also stopped taking his dilantin  He reports mariajuana use, denies daily use and denies cocaine use despite positive UDS  To note, in a previous admission he admits to [facility] was "laced with cocaine "     ED Management: Keppra 2000 mg, IL NS, CT head, Chest XRAY, CT abdomen, lactic acid, CBC, CMP,     Review of Systems   Review of Systems   Constitutional: Negative for fatigue and fever  HENT: Negative for rhinorrhea  Respiratory: Negative for cough and shortness of breath  Cardiovascular: Negative for chest pain and palpitations  Gastrointestinal: Positive for abdominal pain and diarrhea  Negative for nausea and vomiting  Genitourinary: Negative for difficulty urinating  Skin: Negative for rash  Neurological: Positive for seizures and headaches  Historical Information   Past Medical History:   Diagnosis Date    Bipolar 1 disorder ([facility] [address] )     [facility]     Seizures ([facility] )      History reviewed  No pertinent surgical history    Social History History   Alcohol use Not on file     History   Drug use: Unknown     History   Smoking Status    Current Every Day Smoker   Smokeless Tobacco    Current User     Family History: non-contributory    Meds/Allergies   all medications and allergies reviewed  Allergies   Allergen Reactions    No Active Allergies        Objective   Vitals: Blood pressure 132/75, pulse 82, temperature (!) 97 3 °F (36 3 °C), temperature source Tympanic, resp  rate 18, SpO2 96 %  No intake or output data in the 24 hours ending 09/14/18 1222    Invasive Devices     Peripheral Intravenous Line            Peripheral IV 09/14/18 Left Antecubital less than 1 day    Peripheral IV 09/14/18 Right Antecubital less than 1 day                Physical Exam:  Physical Exam   Constitutional: No distress  Somnolent but awakes on command and holds normal conversation  HENT:   Mouth/Throat: Oropharynx is clear and moist    Eyes:   Left pupil sluggish, right pupil reactive    Neck: Neck supple  Cardiovascular: Normal rate, regular rhythm, normal heart sounds and intact distal pulses  No murmur heard  Pulmonary/Chest: Effort normal and breath sounds normal  No respiratory distress  He has no wheezes  He has no rales  Abdominal: Soft  Bowel sounds are normal  He exhibits no distension  There is no tenderness  Musculoskeletal: He exhibits no edema  Neurological:   Somnolent but wakes on command and holds normal conversation, follows directions, no slurred speech   5/5 muscle strength in all extremities  Skin: Skin is warm and dry  Lab Results: I have personally reviewed pertinent reports  Pertinent Lab Results:  · Lactic acid 6 2 --> 2 2  · Glucose 262  · WBC: 19  · Cr: 2 00   · Phenytoin <0 4   · UDS: + benzo, cocaine and THC      Imaging: I have personally reviewed pertinent reports  EKG, Pathology, and Other Studies: I have personally reviewed pertinent reports        Code Status: Level 1 full code       Vinod Bonilla DO  [facility] PGY3  9/14/2018  12:22 PM

## 2018-09-15 LAB
ANION GAP SERPL CALCULATED.3IONS-SCNC: 10 MMOL/L (ref 4–13)
BUN SERPL-MCNC: 16 MG/DL (ref 5–25)
CALCIUM SERPL-MCNC: 8.5 MG/DL (ref 8.3–10.1)
CHLORIDE SERPL-SCNC: 108 MMOL/L (ref 100–108)
CO2 SERPL-SCNC: 21 MMOL/L (ref 21–32)
CREAT SERPL-MCNC: 2.99 MG/DL (ref 0.6–1.3)
ERYTHROCYTE [DISTWIDTH] IN BLOOD BY AUTOMATED COUNT: 13.1 % (ref 11.6–15.1)
EST. AVERAGE GLUCOSE BLD GHB EST-MCNC: 105 MG/DL
GFR SERPL CREATININE-BSD FRML MDRD: 25 ML/MIN/1.73SQ M
GLUCOSE SERPL-MCNC: 92 MG/DL (ref 65–140)
HBA1C MFR BLD: 5.3 % (ref 4.2–6.3)
HCT VFR BLD AUTO: 47.1 % (ref 36.5–49.3)
HGB BLD-MCNC: 15.6 G/DL (ref 12–17)
MCH RBC QN AUTO: 28.3 PG (ref 26.8–34.3)
MCHC RBC AUTO-ENTMCNC: 33.1 G/DL (ref 31.4–37.4)
MCV RBC AUTO: 85 FL (ref 82–98)
PLATELET # BLD AUTO: 232 THOUSANDS/UL (ref 149–390)
PMV BLD AUTO: 11.6 FL (ref 8.9–12.7)
POTASSIUM SERPL-SCNC: 4 MMOL/L (ref 3.5–5.3)
RBC # BLD AUTO: 5.52 MILLION/UL (ref 3.88–5.62)
SODIUM SERPL-SCNC: 139 MMOL/L (ref 136–145)
WBC # BLD AUTO: 13.47 THOUSAND/UL (ref 4.31–10.16)

## 2018-09-15 PROCEDURE — 83036 HEMOGLOBIN GLYCOSYLATED A1C: CPT | Performed by: FAMILY MEDICINE

## 2018-09-15 PROCEDURE — 99233 SBSQ HOSP IP/OBS HIGH 50: CPT | Performed by: PSYCHIATRY & NEUROLOGY

## 2018-09-15 PROCEDURE — 80048 BASIC METABOLIC PNL TOTAL CA: CPT | Performed by: FAMILY MEDICINE

## 2018-09-15 PROCEDURE — 99232 SBSQ HOSP IP/OBS MODERATE 35: CPT | Performed by: FAMILY MEDICINE

## 2018-09-15 PROCEDURE — 85027 COMPLETE CBC AUTOMATED: CPT | Performed by: FAMILY MEDICINE

## 2018-09-15 RX ORDER — ACETAMINOPHEN 325 MG/1
975 TABLET ORAL EVERY 6 HOURS PRN
Status: DISCONTINUED | OUTPATIENT
Start: 2018-09-15 | End: 2018-09-17 | Stop reason: HOSPADM

## 2018-09-15 RX ORDER — SODIUM CHLORIDE 9 MG/ML
150 INJECTION, SOLUTION INTRAVENOUS CONTINUOUS
Status: DISCONTINUED | OUTPATIENT
Start: 2018-09-15 | End: 2018-09-17 | Stop reason: HOSPADM

## 2018-09-15 RX ADMIN — ESCITALOPRAM OXALATE 20 MG: 20 TABLET, FILM COATED ORAL at 09:03

## 2018-09-15 RX ADMIN — SODIUM CHLORIDE 150 ML/HR: 0.9 INJECTION, SOLUTION INTRAVENOUS at 12:40

## 2018-09-15 RX ADMIN — PHENYTOIN SODIUM 400 MG: 100 CAPSULE, EXTENDED RELEASE ORAL at 09:04

## 2018-09-15 RX ADMIN — ACETAMINOPHEN 975 MG: 325 TABLET, FILM COATED ORAL at 13:46

## 2018-09-15 RX ADMIN — PHENYTOIN SODIUM 400 MG: 100 CAPSULE, EXTENDED RELEASE ORAL at 21:18

## 2018-09-15 RX ADMIN — SODIUM CHLORIDE 150 ML/HR: 0.9 INJECTION, SOLUTION INTRAVENOUS at 19:00

## 2018-09-15 NOTE — PROGRESS NOTES
Progress Note - Sae Malik 44 y o  male MRN: [id number]    Unit/Bed#: [facility] 702-01 Encounter: [id number]    SUBJECTIVE  Overnight events: generalized HA this AM, resolved with Tylenol    Review of Systems   Constitutional: Negative  HENT: Negative  Respiratory: Negative  Cardiovascular: Negative  Gastrointestinal: Negative  Genitourinary: Negative  Did not eat dinner and breakfast because normally does not eat breakfast, and dislike food in the hospital   Skin: Negative for rash  Neurological: Positive for headaches  Negative for dizziness, tremors, seizures, syncope, facial asymmetry, speech difficulty, weakness, light-headedness and numbness  Psychiatric/Behavioral: Negative for agitation, behavioral problems, confusion and hallucinations  The patient is not nervous/anxious  OBJECTIVE   Vitals:   Blood pressure 120/74, pulse 61, temperature 98 6 °F (37 °C), temperature source Oral, resp  rate 20, height 5' 9" (1 753 m), weight 70 kg (154 lb 5 2 oz), SpO2 95 %  ,Body mass index is 22 79 kg/m²  Intake/Output Summary (Last 24 hours) at 09/15/18 1524  Last data filed at 09/15/18 1300   Gross per 24 hour   Intake             1080 ml   Output             3325 ml   Net            -2245 ml       Physical Exam   Constitutional: He appears well-developed and well-nourished  No distress  HENT:   Head: Normocephalic and atraumatic  Eyes: Pupils are equal, round, and reactive to light  Neck: Neck supple  Cardiovascular: Normal rate and regular rhythm  Exam reveals no friction rub  No murmur heard  Pulmonary/Chest: Effort normal and breath sounds normal  No respiratory distress  He has no wheezes  He has no rales  Abdominal: Soft  Bowel sounds are normal  He exhibits no distension  There is no tenderness  There is no rebound  Musculoskeletal: Normal range of motion  He exhibits no edema, tenderness or deformity  Lymphadenopathy:     He has no cervical adenopathy  Neurological: He is alert  He has normal reflexes  Oriented to himself and place  Motor strength 5/5 throughout   Skin: Skin is warm  No rash noted  He is not diaphoretic  No erythema  Psychiatric: He has a normal mood and affect  Nursing note and vitals reviewed  Invasive Devices     Peripheral Intravenous Line            Peripheral IV 09/14/18 Right Antecubital 1 day                Lab:  I have personally reviewed pertinent reports  Admission on 09/14/2018   Component Date Value    Ventricular Rate 09/14/2018 112     Atrial Rate 09/14/2018 112     NJ Interval 09/14/2018 136     QRSD Interval 09/14/2018 86     QT Interval 09/14/2018 302     QTC Interval 09/14/2018 412     P Axis 09/14/2018 64     QRS Axis 09/14/2018 78     T Wave Axis 09/14/2018 31     WBC 09/14/2018 19 85*    RBC 09/14/2018 6  15*    Hemoglobin 09/14/2018 17 5*    Hematocrit 09/14/2018 57 9*    MCV 09/14/2018 94     MCH 09/14/2018 28 5     MCHC 09/14/2018 30 2*    RDW 09/14/2018 12 9     MPV 09/14/2018 11 1     Platelets 90/20/2781 364     nRBC 09/14/2018 0     Neutrophils Relative 09/14/2018 51     Immat GRANS % 09/14/2018 2     Lymphocytes Relative 09/14/2018 37     Monocytes Relative 09/14/2018 5     Eosinophils Relative 09/14/2018 4     Basophils Relative 09/14/2018 1     Neutrophils Absolute 09/14/2018 10 10*    Immature Grans Absolute 09/14/2018 0 45*    Lymphocytes Absolute 09/14/2018 7 37*    Monocytes Absolute 09/14/2018 1 07     Eosinophils Absolute 09/14/2018 0 69*    Basophils Absolute 09/14/2018 0 17*    LACTIC ACID 09/14/2018 6 2*    Ethanol Lvl 09/14/2018 <3     Sodium 09/14/2018 136     Potassium 09/14/2018 4 5     Chloride 09/14/2018 101     CO2 09/14/2018 <5*    ANION GAP 09/14/2018      BUN 09/14/2018 13     Creatinine 09/14/2018 2 00*    Glucose 09/14/2018 262*    Calcium 09/14/2018 9 6     eGFR 09/14/2018 40     Amph/Meth UR 09/14/2018 Negative     Barbiturate Ur 09/14/2018 Negative     Benzodiazepine Urine 09/14/2018 Positive*    Cocaine Urine 09/14/2018 Positive*    Methadone Urine 09/14/2018 Negative     Opiate Urine 09/14/2018 Negative     PCP Ur 09/14/2018 Negative     THC Urine 09/14/2018 Positive*    Phenytoin Lvl 09/14/2018 <0 4*    pH, Pete 09/14/2018 7 300     pCO2, Pete 09/14/2018 35 4*    pO2, Pete 09/14/2018 48 4*    HCO3, Pete 09/14/2018 17 0*    Base Excess, Pete 09/14/2018 -8 3     O2 Content, Pete 09/14/2018 19 0     O2 HGB, VENOUS 09/14/2018 77 9     Color, UA 09/14/2018 yellow     Lipase 09/14/2018 207     Ethanol Lvl 92/80/7997 <3     Salicylate Lvl 52/48/9777 <3*    Acetaminophen Level 09/14/2018 <2*    Color, UA 09/14/2018 Yellow     Clarity, UA 09/14/2018 Clear     pH, UA 09/14/2018 5 5     Leukocytes, UA 09/14/2018 Negative     Nitrite, UA 09/14/2018 Negative     Protein, UA 09/14/2018 100 (2+)*    Glucose, UA 09/14/2018 100 (1/10%)*    Ketones, UA 09/14/2018 Negative     Urobilinogen, UA 09/14/2018 0 2     Bilirubin, UA 09/14/2018 Negative     Blood, UA 09/14/2018 Moderate*    Specific Gravity, UA 09/14/2018 1 025     RBC, UA 09/14/2018 None Seen     WBC, UA 09/14/2018 None Seen     Epithelial Cells 09/14/2018 None Seen     Bacteria, UA 09/14/2018 None Seen     Hyaline Casts, UA 09/14/2018 3-5*    LACTIC ACID 09/14/2018 2 2*    Troponin I 09/14/2018 0 10*    Total Bilirubin 09/14/2018 0 39     Bilirubin, Direct 09/14/2018 0 14     Alkaline Phosphatase 09/14/2018 89     AST 09/14/2018 20     ALT 09/14/2018 23     Total Protein 09/14/2018 6 5     Albumin 09/14/2018 3 5     Troponin I 09/14/2018 0 06*    Total CK 09/14/2018 373*    TSH 3RD GENERATON 09/14/2018 0 327*    CK-MB Index 09/14/2018 <1 0     CK-MB FRACTION 09/14/2018 1 9     Sodium 09/15/2018 139     Potassium 09/15/2018 4 0     Chloride 09/15/2018 108     CO2 09/15/2018 21     ANION GAP 09/15/2018 10     BUN 09/15/2018 16     Creatinine 09/15/2018 2 99*    Glucose 09/15/2018 92     Calcium 09/15/2018 8 5     eGFR 09/15/2018 25     WBC 09/15/2018 13 47*    RBC 09/15/2018 5 52     Hemoglobin 09/15/2018 15 6     Hematocrit 09/15/2018 47 1     MCV 09/15/2018 85     MCH 09/15/2018 28 3     MCHC 09/15/2018 33 1     RDW 09/15/2018 13 1     Platelets 96/92/3707 232     MPV 09/15/2018 11 6     Hemoglobin A1C 09/15/2018 5 3     EAG 09/15/2018 105        Results from last 7 days  Lab Units 09/15/18  0509 09/14/18  1035 09/14/18  0658   SODIUM mmol/L 139  --  136   POTASSIUM mmol/L 4 0  --  4 5   CHLORIDE mmol/L 108  --  101   CO2 mmol/L 21  --  <5*   BUN mg/dL 16  --  13   CREATININE mg/dL 2 99*  --  2 00*   EGFR ml/min/1 73sq m 25  --  40   CALCIUM mg/dL 8 5  --  9 6   AST U/L  --  20  --    ALT U/L  --  23  --    ALK PHOS U/L  --  89  --        Results from last 7 days  Lab Units 09/15/18  0509 09/14/18  0658   WBC Thousand/uL 13 47* 19 85*   HEMOGLOBIN g/dL 15 6 17 5*   PLATELETS Thousands/uL 232 364             Medications:  VTE Mechanical Prophylaxis: sequential compression device    Current Facility-Administered Medications   Medication Dose Route Frequency     EMS REPLENISHMENT MED   Does not apply Once     EMS REPLENISHMENT MED   Does not apply Once    acetaminophen (TYLENOL) tablet 975 mg  975 mg Oral Q6H PRN    escitalopram (LEXAPRO) tablet 20 mg  20 mg Oral Daily    LORazepam (ATIVAN) 2 mg/mL injection 2 mg  2 mg Intravenous Q8H PRN    nicotine (NICODERM CQ) 14 mg/24hr TD 24 hr patch 1 patch  1 patch Transdermal Daily    phenytoin (DILANTIN) ER capsule 400 mg  400 mg Oral Q12H [address]    sodium chloride 0 9 % infusion  150 mL/hr Intravenous Continuous       Assessment/Plan:  Patient Active Problem List   Diagnosis    Left rotator cuff tear    Acute pain of left shoulder    Adhesive capsulitis of left shoulder    Anxiety    Depression    Seizure (HCC)    Drug abuse    Bipolar depression (HCC)    ARTHUR [facility]acute kidney injury) ([facility] [address] )    Leukocytosis       Summary:   43 yo M w/ posttraumatic epilepsy with breakthrough seizure  Remaining seizure-free since admission  Hemodynamically stable  Will be likely d/c once remains seizure-free and Phenytoin level reaches therapeutic  1  Posttraumatic epilepsy with breakthrough seizure  Per Neuro, continue Dilantin 400 PO BID and Ativan PRN, check Phenytoin level AM, continue Tele and seizures precautions  Will be likely d/c once remains seizure-free and Phenytoin level reaches therapeutic  2  Leukocytosis  Trending down, most likely from acute stress  No signs of infection now  Will monitor off ABX    3  ARTHUR  Worsening with Cr from 2 to 2 99, likely prerenal 2/2 dehydration vs intrarenal 2/2 muscle breakdown; start  cc/h and encourage pt to orally hydrate; recheck BMP tmr    4  Bipolar disorder  Stable, continue Lexapro 20 daily    5  suspected Drug abuse: pending CM, need to f/u o/p    6  Elevated Trop  From ARTHUR and seizure, trending down from 0 10 to 0 06; asymptomatic; stop checking Trop    Disposition: Anticipate tmr or Monday 9/16/2018  - Code: Full  - PCP: [facility], Dr Consuelo Treadwell  - Diet: ragular  - Ambulation: No dysfunction  - Discharge: Home      The attending physician, Dr Consuelo Treadwell,  agreed with the assessment and plan  We appreciate the input from the consulting teams and case management      Petra Bates MD, PGY-2  [place] [id number]

## 2018-09-15 NOTE — PLAN OF CARE
Problem: DISCHARGE PLANNING - CARE MANAGEMENT  Goal: Discharge to post-acute care or home with appropriate resources  INTERVENTIONS:  - Conduct assessment to determine patient/family and health care team treatment goals, and need for post-acute services based on payer coverage, community resources, and patient preferences, and barriers to discharge  - Address psychosocial, clinical, and financial barriers to discharge as identified in assessment in conjunction with the patient/family and health care team  - Arrange appropriate level of post-acute services according to patient's   needs and preference and payer coverage in collaboration with the physician and health care team  - Communicate with and update the patient/family, physician, and health care team regarding progress on the discharge plan  - Arrange appropriate transportation to post-acute venues  - Discharge pending HOST recommendations  Outcome: Progressing

## 2018-09-15 NOTE — PROGRESS NOTES
Progress Note - Neurology   [address] Lloyd 44 y o  male [id number]  Unit/Bed#: [facility] 702-01    Assessment:  García Lloyd is a 44 y o  male with a history of posttraumatic epilepsy, medication noncompliance, drug abuse and posttraumatic headache who presented with generalized tonic-clonic seizure lasting approximately 7 minutes in his home followed by an additional seizure with EMS  Patient admits to recent drug abuse, as well as medication noncompliance  Plan:  Posttraumatic epilepsy with breakthrough seizure  Seizure occurred in the setting medication noncompliance, hyperglycemia, illicit drug use and dehydration  Patient loaded with Keppra 2g and Fosphenytoin 1g  AED regimen:   - Dilantin 400mg po bid   Phenytoin total level to be drawn tomorrow AM as trough level  Ativan prn   CT head negative for acute abnormality  Bifrontal encephalomalacia and gliosis was noted, with prominence of temporal horns of lateral ventricles bilaterally  Tele  Echo with EF 60%, no RWMA  Labs upon arrival revealed , phenytoin level <0 4, lactic acid 6 2 which then trended down to 2 2, UD positive for benzodiazepines, cocaine and THC  Seizure precautions  Would recommend observation overnight and if patient remains seizure free and if tomorrow's Phenytoin level is therapeutic, the patient would be ok for discharge from a neurology perspective  Ultimately patient will follow-up with outpatient neurology at [facility]    Subjective:   Patient states "I just don't feel right " He is unable to further describe his discomfort to me  He appears restless but denies pain  He appears frustrated saying "I don't know why stuff like this always happens to me " I did explain to the patient that by stopping his seizure medication and by using drugs he is at increased risk for having seizures  Past Medical History:   Diagnosis Date    [facility] ([facility] [address] [facility]     [facility]     Seizures ([facility] )      History reviewed   No pertinent surgical history  Family history:  History reviewed  No pertinent family history  Social History     Social History    Marital status: Significant Other     Spouse name: N/A    Number of children: N/A    Years of education: N/A     Social History Main Topics    Smoking status: Current Every Day Smoker    Smokeless tobacco: Current User    Alcohol use No    Drug use: Yes    Sexual activity: Not Asked     Other Topics Concern    None     Social History Narrative    None       Scheduled Meds:  Current Facility-Administered Medications:  EMS replenish medication  Does not apply Once Madison Netter, DO   EMS replenish medication  Does not apply Once Madison Netter, DO   escitalopram 20 mg Oral Daily Jaz Ramirez, DO   LORazepam 2 mg Intravenous Q8H PRN Rosalio Heavenly, DO   nicotine 1 patch Transdermal Daily Jazbrenda Ramirez, DO   phenytoin 400 mg Oral Q12H [facility] Fiorella Guillory PA-C     Continuous Infusions:   PRN Meds:  LORazepam    ROS: A 12 point ROS was completed and other than the above mentioned complaints in the HPI, all remaining systems were negative  Vitals: /67 (BP Location: Left arm)   Pulse (!) 53   Temp 98 6 °F (37 °C) (Oral)   Resp 18   Ht 5' 9" (1 753 m)   Wt 70 kg (154 lb 5 2 oz)   SpO2 98%   BMI 22 79 kg/m²     Physical Exam:   Physical Exam   Constitutional: He appears well-developed and well-nourished  No distress  Supine in bed   HENT:   Head: Normocephalic and atraumatic  Eyes: Conjunctivae and EOM are normal  Pupils are equal, round, and reactive to light  Right eye exhibits no discharge  Left eye exhibits no discharge  No scleral icterus  Neck: Normal range of motion  Neck supple  Cardiovascular: Normal rate, regular rhythm and normal heart sounds  Exam reveals no gallop and no friction rub  No murmur heard  Pulmonary/Chest: Effort normal and breath sounds normal  No stridor  No respiratory distress  He has no wheezes  He has no rales   He exhibits no tenderness  Musculoskeletal: Normal range of motion  He exhibits no edema, tenderness or deformity  Neurological: He is alert  He has normal strength  Skin: Skin is warm and dry  No rash noted  No erythema  Psychiatric:   Restless     Neurologic Exam     Mental Status   Patient is alert and oriented to person and place  His orientation to time waxes and wanes  On first exam he only could recall the year, on the second exam he was unable to recall year, but could then recall the month, Patient able to complete simple math calculations like 5+5 and 10-3, but not able to do 9+5 or 100-7  Patient unable to do any monetary calculations  He knows current and prior president  Speech is fluent with do evidence of dysarthria or aphasia  Cranial Nerves     CN II   Visual acuity: normal (grossly)    CN III, IV, VI   Pupils are equal, round, and reactive to light  Extraocular motions are normal    Nystagmus: none   Diplopia: none  Conjugate gaze: present    CN V   Facial sensation intact  CN VII   Facial expression full, symmetric  CN VIII   Hearing: intact (grossly)    CN XI   CN XI normal      CN XII   CN XII normal      Motor Exam   Muscle bulk: normal  Overall muscle tone: normal    Strength   Strength 5/5 throughout       Sensory Exam   Light touch normal        Labs:  Recent Results (from the past 24 hour(s))   Troponin I    Collection Time: 09/14/18 12:32 PM   Result Value Ref Range    Troponin I 0 10 (H) <=0 04 ng/mL   Troponin I    Collection Time: 09/14/18  4:05 PM   Result Value Ref Range    Troponin I 0 06 (H) <=0 04 ng/mL   TSH, 3rd generation    Collection Time: 09/14/18  4:05 PM   Result Value Ref Range    TSH 3RD GENERATON 0 327 (L) 0 358 - 3 740 uIU/mL   Basic metabolic panel    Collection Time: 09/15/18  5:09 AM   Result Value Ref Range    Sodium 139 136 - 145 mmol/L    Potassium 4 0 3 5 - 5 3 mmol/L    Chloride 108 100 - 108 mmol/L    CO2 21 21 - 32 mmol/L    ANION GAP 10 4 - 13 mmol/L BUN 16 5 - 25 mg/dL    Creatinine 2 99 (H) 0 60 - 1 30 mg/dL    Glucose 92 65 - 140 mg/dL    Calcium 8 5 8 3 - 10 1 mg/dL    eGFR 25 ml/min/1 73sq m   CBC (With Platelets)    Collection Time: 09/15/18  5:09 AM   Result Value Ref Range    WBC 13 47 (H) 4 31 - 10 16 Thousand/uL    RBC 5 52 3 88 - 5 62 Million/uL    Hemoglobin 15 6 12 0 - 17 0 g/dL    Hematocrit 47 1 36 5 - 49 3 %    MCV 85 82 - 98 fL    MCH 28 3 26 8 - 34 3 pg    MCHC 33 1 31 4 - 37 4 g/dL    RDW 13 1 11 6 - 15 1 %    Platelets 831 779 - 015 Thousands/uL    MPV 11 6 8 9 - 12 7 fL   Hemoglobin A1C w/ EAG Estimation    Collection Time: 09/15/18  5:09 AM   Result Value Ref Range    Hemoglobin A1C 5 3 4 2 - 6 3 %     mg/dl       Imaging: I have personally reviewed pertinent imaging and PACS reports       VTE Prophylaxis: Sequential compression device (Venodyne)

## 2018-09-15 NOTE — SOCIAL WORK
CM met w/pt to discuss CM role and possible d/c needs  Pt advised he is independent in ADLs pta  Pt stated he lives with family in 1st floor apartment and uses no DME  No hx HHC or inpatient rehab  Pt has history of inpatient MH/D&A treatment "a long time ago" and does not remember facility  CM and pt discussed HOST program and pt advised that he is agreeable to speaking with  HOST liaison  HOST referral made per pt request  CM following

## 2018-09-16 LAB
ANION GAP SERPL CALCULATED.3IONS-SCNC: 7 MMOL/L (ref 4–13)
ANION GAP SERPL CALCULATED.3IONS-SCNC: 8 MMOL/L (ref 4–13)
BUN SERPL-MCNC: 11 MG/DL (ref 5–25)
BUN SERPL-MCNC: 15 MG/DL (ref 5–25)
CALCIUM SERPL-MCNC: 8 MG/DL (ref 8.3–10.1)
CALCIUM SERPL-MCNC: 8.5 MG/DL (ref 8.3–10.1)
CHLORIDE SERPL-SCNC: 109 MMOL/L (ref 100–108)
CHLORIDE SERPL-SCNC: 109 MMOL/L (ref 100–108)
CO2 SERPL-SCNC: 22 MMOL/L (ref 21–32)
CO2 SERPL-SCNC: 24 MMOL/L (ref 21–32)
CREAT SERPL-MCNC: 2.14 MG/DL (ref 0.6–1.3)
CREAT SERPL-MCNC: 2.43 MG/DL (ref 0.6–1.3)
GFR SERPL CREATININE-BSD FRML MDRD: 32 ML/MIN/1.73SQ M
GFR SERPL CREATININE-BSD FRML MDRD: 37 ML/MIN/1.73SQ M
GLUCOSE SERPL-MCNC: 160 MG/DL (ref 65–140)
GLUCOSE SERPL-MCNC: 75 MG/DL (ref 65–140)
PHENYTOIN SERPL-MCNC: 25.2 UG/ML (ref 10–20)
POTASSIUM SERPL-SCNC: 3.8 MMOL/L (ref 3.5–5.3)
POTASSIUM SERPL-SCNC: 3.9 MMOL/L (ref 3.5–5.3)
SODIUM SERPL-SCNC: 139 MMOL/L (ref 136–145)
SODIUM SERPL-SCNC: 140 MMOL/L (ref 136–145)

## 2018-09-16 PROCEDURE — 80048 BASIC METABOLIC PNL TOTAL CA: CPT | Performed by: FAMILY MEDICINE

## 2018-09-16 PROCEDURE — 99232 SBSQ HOSP IP/OBS MODERATE 35: CPT | Performed by: FAMILY MEDICINE

## 2018-09-16 PROCEDURE — 80185 ASSAY OF PHENYTOIN TOTAL: CPT | Performed by: PHYSICIAN ASSISTANT

## 2018-09-16 RX ADMIN — ESCITALOPRAM OXALATE 20 MG: 20 TABLET, FILM COATED ORAL at 08:26

## 2018-09-16 RX ADMIN — PHENYTOIN SODIUM 400 MG: 100 CAPSULE, EXTENDED RELEASE ORAL at 08:26

## 2018-09-16 RX ADMIN — SODIUM CHLORIDE 150 ML/HR: 0.9 INJECTION, SOLUTION INTRAVENOUS at 17:17

## 2018-09-16 RX ADMIN — PHENYTOIN SODIUM 400 MG: 100 CAPSULE, EXTENDED RELEASE ORAL at 20:21

## 2018-09-16 RX ADMIN — SODIUM CHLORIDE 150 ML/HR: 0.9 INJECTION, SOLUTION INTRAVENOUS at 08:31

## 2018-09-16 RX ADMIN — SODIUM CHLORIDE 150 ML/HR: 0.9 INJECTION, SOLUTION INTRAVENOUS at 23:12

## 2018-09-16 RX ADMIN — SODIUM CHLORIDE 1000 ML: 0.9 INJECTION, SOLUTION INTRAVENOUS at 14:18

## 2018-09-16 RX ADMIN — SODIUM CHLORIDE 150 ML/HR: 0.9 INJECTION, SOLUTION INTRAVENOUS at 01:54

## 2018-09-16 NOTE — PROGRESS NOTES
Pt's mother asked for a doctor's note for patient's court hearing tomorrow  Talked to [facility] resident/doctor over the phone and he said he will give it to the patient later

## 2018-09-16 NOTE — PROGRESS NOTES
Progress Note - Adult  García Lloyd 44 y o  male MRN: [id number]  Unit/Bed#: ZNPD 769-03 Encounter: [id number]    Date of Admission: 9/14/2018  6:35 AM  Chief Complaint:   Chief Complaint   Patient presents with    Seizure - Prior Hx Of     per EMS report patient has had multple grandmal seizures  Pt post-ictal and combative at this time  vitals stable       Principal Problem:    Seizure ([facility] )  Active Problems:    Drug abuse    [facility] ([facility] )    ARTHUR (acute kidney injury) ([facility] )    Leukocytosis    Summary Statement: García Lloyd is a 44 y o  male  with history of posttraumatic epilepsy, medication noncompliance with PTA phenytoin, drug abuse and posttraumatic headache who was admitted on 9/14/2018  generalized tonic-clonic seizure lasting approximately 7 minutes in his home followed by an additional seizure with EMS  Patient admits to recent drug abuse, as well as medication noncompliance  On admission, negative head CT, UDS positive for marijuana and cocaine, phenytoin level less than 0 4  Patient seen by Neurology  Seizure free since admission now on home phenytoin  ARTHUR likely related to seizure activity versus increased CPK levels and improving on IV fluids  Per Neurology if phenytoin levels normal the patient continues to remain seizure-free and medically cleared can be discharged home  Will continue IV fluids and repeat BMP later today consider discharge  Assessment and Plan:   García Lloyd is a 44y o  year old male with:    Leukocytosis   Assessment & Plan    Improving, likely stress response from multiple tonic clonic seizures   WBC 13 47 on 9/15  No signs or symptoms of infection, urine microscopy negative, CT abdomen and chest x-ray negative  Lactic acidosis trended down             ARTHUR (acute kidney injury) [facility])   Assessment & Plan    Baseline creatinine 1 15-1 23, improving today on IVF to 2 43  Good urine output, +425cc(3675/3250)  Creatinine 2 00 on admission, increased to 2 99 on 09/15, likely related to increased CPK related to seizure activity  Patient given aggressive IV fluid hydration With normal saline at 150 cc an hour  -repeat BMP pending this morning      Bipolar depression ([facility] [address] )   Assessment & Plan    Patient self- discontinued psych meds due to drowsiness, except lexapro 20 mg with non-compliance  -continue Lexapro 25 mg p o  daily  -F/U with PCP or Psych outpatient and Pyschiatrist Estee Gupta in [facility]        Drug abuse   Assessment & Plan    UDS positive for cocaine and marijuana, likely contributing to breakthrough seizure  -encourage abstinence from illicit drugs including marijuana and cocaine given positive UDS        * Seizure [facility])   Assessment & Plan    Likely due to medication non-compliance given Phenytoin level <0 4 on admission  Patient remained seizure free since admission  Per Neuro, continue Dilantin 400 PO BID and Ativan PRN ( patient did not require any p r n  Ativan)   -phenytoin level this morning pending, if therapeutic and patient remains seizure free, patient able to be discharged per Neurology  -continue seizure precautions   -Follow up with Neurology as outpatient        Elevated Trop  Resolved, likely secondary to ARTHUR and seizure, trended down from 0 10 to 0 06; asymptomatic    FEN:  cc NS, Lytes WNL, Regular diet  DVT PPx: SCDs  Code Status: Level 1 - Full Code  Dispo:  Pending repeat Phenytoin levels, will consider discharge later today if ARTHUR continues to improve, will discuss with neurology    Subjective:  No cp, sob  Denies difficulty with eating, drinking, voiding, elimination  However complaining of some weakness likely due to prolong bedrest   Overnight Events/RN Concerns: None    Objective: Body mass index is 22 79 kg/m²    Vitals:    09/15/18 0740 09/15/18 1213 09/15/18 2200 09/16/18 0755   BP: 115/67 120/74 115/60 116/71   BP Location: Left arm Left arm Left arm Left arm   Pulse: (!) 53 61 59 62   Resp: 18 20 20 18   Temp: 98 6 °F (37 °C) 98 1 °F (36 7 °C)    TempSrc: Oral  Oral Oral   SpO2: 98% 95% 98% 97%   Weight:       Height:           Intake/Output Summary (Last 24 hours) at 18 1018  Last data filed at 18 0831   Gross per 24 hour   Intake             4548 ml   Output             3550 ml   Net              998 ml     HR:  [59-62] 62  Resp:  [18-20] 18  BP: (115-120)/(60-74) 116/71  SpO2:  [95 %-98 %] 97 %  Temp (48hrs), Av 4 °F (36 9 °C), Min:98 1 °F (36 7 °C), Max:98 6 °F (37 °C)  Current: Temperature: 98 1 °F (36 7 °C)  Physical Exam  General:  Awake and alert, coherent communication, NAD  HEENT: MMM, EOMI, Sclera anicteric with no hemorrhages or discharge  Lungs:  CTAb/l, No wheezes/rales/ronchi  Cardiac:  RRR, no MRG, normal S1S2  Abdomen:  Soft, non-tender, normoactive bowel sounds all 4 quadrants  Extremities:  Non-tender, no cyanosis or edema  Skin:  Warm and dry  Psych: Appropriate mood  Slightly blunted affect  Neuro: Strength and sensation grossly intact, no FND    Results from last 7 days  Lab Units 09/15/18  0509 18  0658   WBC Thousand/uL 13 47* 19 85*   HEMOGLOBIN g/dL 15 6 17 5*   HEMATOCRIT % 47 1 57 9*   PLATELETS Thousands/uL 232 364   NEUTROS PCT %  --  51   MONOS PCT %  --  5       Results from last 7 days  Lab Units 18  0515 09/15/18  0509 18  1035 18  0658   SODIUM mmol/L 140 139  --  136   POTASSIUM mmol/L 3 9 4 0  --  4 5   CHLORIDE mmol/L 109* 108  --  101   CO2 mmol/L 24 21  --  <5*   BUN mg/dL 15 16  --  13   CREATININE mg/dL 2 43* 2 99*  --  2 00*   CALCIUM mg/dL 8 5 8 5  --  9 6   ALK PHOS U/L  --   --  89  --    ALT U/L  --   --  23  --    AST U/L  --   --  20  --          No results found for: PHOS     This case was discussed with attending physician, Dr Monte Cabot Manya Miu, MD

## 2018-09-16 NOTE — DISCHARGE SUMMARY
Discharge Summary - Dayan Childers 44 y o  male MRN: [id number]    [facility] Encounter: [id number]    Admission Date: 9/14/2018   Discharge Date: 9/17/2018  Condition at Discharge: good   Disposition: Home     Admitting Diagnosis: Seizure ([facility] [facility] ) [R56 9]  Cocaine abuse [F14 10]  Abdominal pain [R10 9]  Noncompliance with medication regimen [Z91 14]  ARTHUR (acute kidney injury) ([facility] [address] ) [N17 9]    Secondary Diagnoses:   Patient Active Problem List   Diagnosis    Left rotator cuff tear    Acute pain of left shoulder    Adhesive capsulitis of left shoulder    Anxiety    Depression    Seizure ([facility] )    Drug abuse    Bipolar depression ([facility] )    [facility]    Leukocytosis       Important Physician Related Follow Up:   · PCP  · Transition to HOST program from home  · Psychiatry [facility]  ·  Neuropsychiatry as detailed in AVS Follow up providers' section    Procedures Performed: None    Significant Findings/Abnormal Results with this admission: None    HPI: (Per admitting physician)  Dayan Childers is a 44 y o  male with a history of drug abuse, bipolar depression and seizure disorder who [facility]  The patient's mother reports he woke up with morning with garbled non-intelligent speech and then had multiple episodes of eyes rolling back, becoming stiff and full body shaking, the longest lasting about 7 minutes  The patient reports over the past few days he hasn't been feeling well with abdominal pain and heartburn, with episodes of feeling faint at times  Due to this he has been not having good po intake and also stopped taking his dilantin  He reports mariajuana use, denies daily use and denies cocaine use despite positive UDS   To note, in a previous admission he admits to [facility] was "laced with cocaine "     Hospital Course: Dayan Childers is a 44 y o  male  with history of posttraumatic epilepsy, medication noncompliance with PTA phenytoin, drug abuse and posttraumatic headache who was admitted on 2018  generalized tonic-clonic seizure lasting approximately 7 minutes in his home followed by an additional seizure with EMS  Patient admits to recent drug abuse, as well as medication noncompliance  On admission, negative head CT, UDS positive for marijuana and cocaine, phenytoin level less than 0 4  Patient was also found to have ARTHUR likely related to seizure activity versus increased CPK levels which has resolved on IV fluids  Patient monitored by Neurology for seizures and given that he has remained seizure free since admission now on home phenytoin, and repeat phenytoin levels improved to ~25, he is appropriate for discharge home  Patient continues to remain asymptomatic, hemodynamically stable, tolerating PO well without any voiding and elimination difficulties  He is being discharged home to self care with mom, and will transition to HOST from from home  Patient states he has a court date tomorrow and request a doctor's note  Note provided  Complications: None    Physical Exam on Day of Discharge  Temp:  [98 1 °F (36 7 °C)-98 7 °F (37 1 °C)] 98 1 °F (36 7 °C)  HR:  [53-62] 53  Resp:  [16-18] 18  BP: (112-114)/(68-74) 114/68  Temp (24hrs), Av 4 °F (36 9 °C), Min:98 1 °F (36 7 °C), Max:98 7 °F (37 1 °C)  Physical Exam:  General:  Awake and alert, coherent communication, NAD  HEENT: MMM, EOMI, Sclera anicteric with no hemorrhages or discharge  Lungs:  CTAb/l, No wheezes/rales/ronchi  Cardiac:  RRR, no MRG, normal S1S2  Abdomen:  Soft, non-tender, normoactive bowel sounds all 4 quadrants  Extremities:  Non-tender, no cyanosis or edema  Skin:  Warm and dry  Psych: Appropriate mood and affect    Discharge instructions/Information to patient and family:   See after visit summary for information provided to patient and family        Provisions for Follow-Up Care:  See after visit summary for information related to follow-up care and any pertinent home health orders  Planned Readmission: No    Imaging:  Ct Abdomen Pelvis Wo Contrast    Result Date: 9/14/2018  Narrative: CT ABDOMEN AND PELVIS WITHOUT IV CONTRAST INDICATION:   tenderness diffusely  Impression: No acute pathology  Workstation performed: [id number]   Xr Chest Portable    Result Date: 9/14/2018  Narrative: CHEST INDICATION:   seizure, cough r/o infection  COMPARISON:  None EXAM PERFORMED/VIEWS:  XR CHEST PORTABLE FINDINGS: Cardiomediastinal silhouette appears unremarkable  The lungs are clear  No pneumothorax or pleural effusion  Osseous structures appear within normal limits for patient age  Screw anchor overlies the left humeral neck  Impression: No acute cardiopulmonary disease  Workstation performed: [id number]     Ct Head Without Contrast  Result Date: 9/14/2018  Narrative: CT BRAIN - WITHOUT CONTRAST INDICATION:   seizures  Impression: 1  No intracranial hemorrhage or calvarial fracture  No acute intracranial abnormality  2   Stable bifrontal encephalomalacia and gliosis, likely posttraumatic  3   Prominence of the temporal horns of the lateral ventricles bilaterally  Correlate for mesial temporal sclerosis versus prior temporal lobe trauma  Workstation performed: [id number]       Discharge Medications:  See after visit summary for reconciled discharge medications provided to patient and family    Medication changes made with this admission:     Stopped Medications:  · None    Changed Medications:  · None    New Medications:  · Restart Dilantin 400 mg PO BID (patient had previously self discontinued)    Continued Medications:  · Lexapro 20 mg PO  daily

## 2018-09-16 NOTE — PLAN OF CARE
CARDIOVASCULAR - ADULT     Maintains optimal cardiac output and hemodynamic stability Progressing     Absence of cardiac dysrhythmias or at baseline rhythm Progressing        DISCHARGE PLANNING     Discharge to home or other facility with appropriate resources Progressing        DISCHARGE PLANNING - CARE MANAGEMENT     Discharge to post-acute care or home with appropriate resources Progressing        Knowledge Deficit     Patient/family/caregiver demonstrates understanding of disease process, treatment plan, medications, and discharge instructions Progressing        METABOLIC, FLUID AND ELECTROLYTES - ADULT     Electrolytes maintained within normal limits Progressing     Fluid balance maintained Progressing     Glucose maintained within target range Progressing        MUSCULOSKELETAL - ADULT     Maintain or return mobility to safest level of function Progressing     Maintain proper alignment of affected body part Progressing        NEUROSENSORY - ADULT     Achieves stable or improved neurological status Progressing     Absence of seizures Progressing     Remains free of injury related to seizures activity Progressing     Achieves maximal functionality and self care Progressing        Nutrition/Hydration-ADULT     Nutrient/Hydration intake appropriate for improving, restoring or maintaining nutritional needs Progressing        PAIN - ADULT     Verbalizes/displays adequate comfort level or baseline comfort level Progressing        Potential for Falls     Patient will remain free of falls Progressing        Prexisting or High Potential for Compromised Skin Integrity     Skin integrity is maintained or improved Progressing        SAFETY ADULT     Maintain or return to baseline ADL function Progressing     Maintain or return mobility status to optimal level Progressing        SAFETY,RESTRAINT: NV/NON-SELF DESTRUCTIVE BEHAVIOR     Remains free of harm/injury (restraint for non violent/non self-detsructive behavior) Progressing     Returns to optimal restraint-free functioning Progressing

## 2018-09-16 NOTE — PROGRESS NOTES
RAC IV site seemed to leak a tiny bit at insertion site, cleansed and redressed and so far no further leaking noted will monitor and pt aware if leaks again may need to change the site

## 2018-09-17 VITALS
BODY MASS INDEX: 22.86 KG/M2 | SYSTOLIC BLOOD PRESSURE: 110 MMHG | WEIGHT: 154.32 LBS | TEMPERATURE: 97.8 F | DIASTOLIC BLOOD PRESSURE: 66 MMHG | HEIGHT: 69 IN | RESPIRATION RATE: 18 BRPM | HEART RATE: 66 BPM | OXYGEN SATURATION: 98 %

## 2018-09-17 LAB
ANION GAP SERPL CALCULATED.3IONS-SCNC: 10 MMOL/L (ref 4–13)
ANION GAP SERPL CALCULATED.3IONS-SCNC: 6 MMOL/L (ref 4–13)
BUN SERPL-MCNC: 7 MG/DL (ref 5–25)
BUN SERPL-MCNC: 8 MG/DL (ref 5–25)
CALCIUM SERPL-MCNC: 8.4 MG/DL (ref 8.3–10.1)
CALCIUM SERPL-MCNC: 8.5 MG/DL (ref 8.3–10.1)
CHLORIDE SERPL-SCNC: 109 MMOL/L (ref 100–108)
CHLORIDE SERPL-SCNC: 111 MMOL/L (ref 100–108)
CO2 SERPL-SCNC: 23 MMOL/L (ref 21–32)
CO2 SERPL-SCNC: 25 MMOL/L (ref 21–32)
CREAT SERPL-MCNC: 1.59 MG/DL (ref 0.6–1.3)
CREAT SERPL-MCNC: 1.71 MG/DL (ref 0.6–1.3)
GFR SERPL CREATININE-BSD FRML MDRD: 48 ML/MIN/1.73SQ M
GFR SERPL CREATININE-BSD FRML MDRD: 53 ML/MIN/1.73SQ M
GLUCOSE SERPL-MCNC: 116 MG/DL (ref 65–140)
GLUCOSE SERPL-MCNC: 87 MG/DL (ref 65–140)
POTASSIUM SERPL-SCNC: 3.9 MMOL/L (ref 3.5–5.3)
POTASSIUM SERPL-SCNC: 4 MMOL/L (ref 3.5–5.3)
SODIUM SERPL-SCNC: 142 MMOL/L (ref 136–145)
SODIUM SERPL-SCNC: 142 MMOL/L (ref 136–145)

## 2018-09-17 PROCEDURE — 80048 BASIC METABOLIC PNL TOTAL CA: CPT | Performed by: FAMILY MEDICINE

## 2018-09-17 PROCEDURE — 99238 HOSP IP/OBS DSCHRG MGMT 30/<: CPT | Performed by: FAMILY MEDICINE

## 2018-09-17 RX ADMIN — SODIUM CHLORIDE 150 ML/HR: 0.9 INJECTION, SOLUTION INTRAVENOUS at 06:02

## 2018-09-17 RX ADMIN — ESCITALOPRAM OXALATE 20 MG: 20 TABLET, FILM COATED ORAL at 08:40

## 2018-09-17 RX ADMIN — SODIUM CHLORIDE 1000 ML: 0.9 INJECTION, SOLUTION INTRAVENOUS at 08:44

## 2018-09-17 RX ADMIN — PHENYTOIN SODIUM 400 MG: 100 CAPSULE, EXTENDED RELEASE ORAL at 08:40

## 2018-09-17 NOTE — SOCIAL WORK
CM spoke to Andrea Gillis with HOST who states she attempted to meet with pt--pt refused assessment

## 2018-09-17 NOTE — PLAN OF CARE
CARDIOVASCULAR - ADULT     Maintains optimal cardiac output and hemodynamic stability Adequate for Discharge     Absence of cardiac dysrhythmias or at baseline rhythm Adequate for Discharge        DISCHARGE PLANNING     Discharge to home or other facility with appropriate resources Adequate for Discharge        DISCHARGE PLANNING - CARE MANAGEMENT     Discharge to post-acute care or home with appropriate resources Adequate for Discharge        Knowledge Deficit     Patient/family/caregiver demonstrates understanding of disease process, treatment plan, medications, and discharge instructions Adequate for Discharge        METABOLIC, FLUID AND ELECTROLYTES - ADULT     Electrolytes maintained within normal limits Adequate for Discharge     Fluid balance maintained Adequate for Discharge     Glucose maintained within target range Adequate for Discharge        NEUROSENSORY - ADULT     Achieves stable or improved neurological status Adequate for Discharge     Absence of seizures Adequate for Discharge     Remains free of injury related to seizures activity Adequate for Discharge     Achieves maximal functionality and self care Adequate for Discharge        Nutrition/Hydration-ADULT     Nutrient/Hydration intake appropriate for improving, restoring or maintaining nutritional needs Adequate for Discharge        PAIN - ADULT     Verbalizes/displays adequate comfort level or baseline comfort level Adequate for Discharge        Potential for Falls     Patient will remain free of falls Adequate for Discharge        Prexisting or High Potential for Compromised Skin Integrity     Skin integrity is maintained or improved Adequate for Discharge        SAFETY ADULT     Maintain or return to baseline ADL function Adequate for Discharge     Maintain or return mobility status to optimal level Adequate for Discharge

## 2018-09-17 NOTE — RESTORATIVE TECHNICIAN NOTE
Restorative Specialist Mobility Note       Activity: Ambulate in vanegas, Ambulate in room, Bathroom privileges, Dangle, Stand at bedside (Educated/encouraged pt to ambulate with assistance 3-4 x's/day  Bed alarm on   Pt callbell, phone/tray within reach )     Assistive Device: None       Graeme ALEJANDRE, Restorative Technician, [employer] [facility]

## 2018-09-17 NOTE — PLAN OF CARE
CARDIOVASCULAR - ADULT     Maintains optimal cardiac output and hemodynamic stability Progressing     Absence of cardiac dysrhythmias or at baseline rhythm Progressing        DISCHARGE PLANNING     Discharge to home or other facility with appropriate resources Progressing        DISCHARGE PLANNING - CARE MANAGEMENT     Discharge to post-acute care or home with appropriate resources Progressing        Knowledge Deficit     Patient/family/caregiver demonstrates understanding of disease process, treatment plan, medications, and discharge instructions Progressing        METABOLIC, FLUID AND ELECTROLYTES - ADULT     Electrolytes maintained within normal limits Progressing     Fluid balance maintained Progressing     Glucose maintained within target range Progressing        NEUROSENSORY - ADULT     Achieves stable or improved neurological status Progressing     Absence of seizures Progressing     Remains free of injury related to seizures activity Progressing     Achieves maximal functionality and self care Progressing        Nutrition/Hydration-ADULT     Nutrient/Hydration intake appropriate for improving, restoring or maintaining nutritional needs Progressing        PAIN - ADULT     Verbalizes/displays adequate comfort level or baseline comfort level Progressing        Potential for Falls     Patient will remain free of falls Progressing        Prexisting or High Potential for Compromised Skin Integrity     Skin integrity is maintained or improved Progressing        SAFETY ADULT     Maintain or return to baseline ADL function Progressing     Maintain or return mobility status to optimal level Progressing

## 2019-01-11 ENCOUNTER — OFFICE VISIT (OUTPATIENT)
Dept: FAMILY MEDICINE CLINIC | Facility: CLINIC | Age: 44
End: 2019-01-11

## 2019-01-11 VITALS
OXYGEN SATURATION: 95 % | DIASTOLIC BLOOD PRESSURE: 78 MMHG | WEIGHT: 162 LBS | HEART RATE: 83 BPM | TEMPERATURE: 97.2 F | SYSTOLIC BLOOD PRESSURE: 110 MMHG | RESPIRATION RATE: 16 BRPM | BODY MASS INDEX: 23.92 KG/M2

## 2019-01-11 DIAGNOSIS — F31.5 BIPOLAR DISORD, CRNT EPSD DEPRESS, SEVERE, W PSYCH FEATURES (HCC): ICD-10-CM

## 2019-01-11 DIAGNOSIS — M25.512 LEFT SHOULDER PAIN, UNSPECIFIED CHRONICITY: Primary | ICD-10-CM

## 2019-01-11 DIAGNOSIS — R56.9 SEIZURE (HCC): ICD-10-CM

## 2019-01-11 DIAGNOSIS — Z23 NEED FOR VACCINATION: ICD-10-CM

## 2019-01-11 PROBLEM — K21.9 GASTROESOPHAGEAL REFLUX DISEASE: Status: ACTIVE | Noted: 2018-01-31

## 2019-01-11 PROBLEM — F19.10 POLYSUBSTANCE ABUSE (HCC): Status: ACTIVE | Noted: 2017-08-01

## 2019-01-11 PROCEDURE — 99213 OFFICE O/P EST LOW 20 MIN: CPT | Performed by: FAMILY MEDICINE

## 2019-01-11 RX ORDER — QUETIAPINE FUMARATE 100 MG/1
100 TABLET, FILM COATED ORAL
Refills: 0 | COMMUNITY
Start: 2018-12-23

## 2019-01-11 RX ORDER — HYDROXYZINE HYDROCHLORIDE 25 MG/1
TABLET, FILM COATED ORAL
Refills: 0 | COMMUNITY
Start: 2018-11-16 | End: 2019-08-15 | Stop reason: ALTCHOICE

## 2019-01-11 RX ORDER — CLONAZEPAM 0.5 MG/1
0.5 TABLET ORAL
Refills: 0 | COMMUNITY
Start: 2018-12-23

## 2019-01-11 RX ORDER — PRAZOSIN HYDROCHLORIDE 2 MG/1
2 CAPSULE ORAL 2 TIMES DAILY
Refills: 0 | COMMUNITY
Start: 2018-12-23 | End: 2022-04-25

## 2019-01-11 NOTE — PROGRESS NOTES
Assessment/Plan:    Left shoulder pain  - MRI 1/11/2019 on Care Everywhere with full thickness tear of anterior portion of supraspinatus tendon, medial displacement of long head of biceps tendon  Evidence of prior left shoulder injury  - Following with Ortho at [facility]    Bipolar disorder  - No SI/ HI during visit  - Following with    - Informed patient of SW support in office in case he requires additional assistance and support groups  Patient acknowledged but did not require any further assistance at this point  Seizure  - Following with [facility] Neuro  - On Dilantin  - No breakthrough seizures on current dosage    Will schedule annual physical during next visit      Diagnoses and all orders for this visit:    Left shoulder pain, unspecified chronicity    Bipolar disord, crnt epsd depress, severe, w psych features ([facility] [address] )    Need for vaccination  -     PNEUMOCOCCAL POLYSACCHARIDE VACCINE 23-VALENT =>3YO SQ IM  -     SYRINGE/SINGLE-DOSE VIAL: influenza vaccine, [id number], quadrivalent, 0 5 mL, preservative-free (AFLURIA, FLUARIX, FLULAVAL, FLUZONE)    Seizure ([facility] )    Other orders  -     Cancel: TDAP VACCINE GREATER THAN OR EQUAL TO 6YO IM  -     clonazePAM (KlonoPIN) 0 5 mg tablet; Take 0 5 mg by mouth daily at bedtime  -     prazosin (MINIPRESS) 2 mg capsule; Take 2 mg by mouth 2 (two) times a day  -     hydrOXYzine HCL (ATARAX) 25 mg tablet; TAKE 1 TABLET (25 MG TOTAL) BY MOUTH EVERY 4 (FOUR) HOURS AS NEEDED FOR ANXIETY  -     QUEtiapine (SEROquel) 100 mg tablet; Take 100 mg by mouth daily at bedtime          Subjective:      Patient ID: Prosper Osman is a 44 y o  male  44 y o male with PMH of seizure disorder/ BPD/ depression/ left shoulder adhesive capsulitis/ rotator cuff tear s/p shoulder surgery presents for follow up  He was admitted into [facility] on 11/7/2018 for suicidal ideation  Per patient was hospitalized for 2 weeks  States his mood has been fluctuant since discharge   Main concern is he feels lack of peer support  Stays at home with his mother  Working with [facility] staff to get into depression support group  Also seeing his therapist q weekly, has appointment today  Sees his psychiatrist q monthly  Denies any SI/HI during this visit  Compliant with his medications  For his left shoulder pain  First sustained injury to left shoulder after he had a seizure  Per chart review this was possibly as early as 2017  Had ORIF of humerus fracture  His shoulder has been bothering him since  He developed adhesions in the shoulder and underwent lysis and rotator cuff repair 5/30/2018 per chart  Still continues to have pain, movement restriction in left shoulder with numbness over left UE along ulnar side upto hand  Has bene following with [facility]  Had MRI done yesterday and will be seeing ortho on 1/15/2019  Patient is left hand dominant     For his seizures, on Dilantin  Follows with [facility]  Denies any breakthrough seizures  States he smokes MJ on and off  Denies smoking cigarettes/ alcohol  Denies any IV drug use now  The following portions of the patient's history were reviewed and updated as appropriate: He  has a past medical history of Bipolar 1 disorder ([facility] [address] ); Schizophrenia ([facility] [address] ); and Seizures ([facility] [address] )  Patient Active Problem List    Diagnosis Date Noted    Seizure [facility]) 09/14/2018    [facility] 09/14/2018    Bipolar depression ([facility] [address] ) 09/14/2018    ARTHUR (acute kidney injury) ([facility] [address] ) 09/14/2018    Leukocytosis 09/14/2018    Left rotator cuff tear 09/12/2018    Acute pain of left shoulder 03/13/2018    Adhesive capsulitis of left shoulder 03/13/2018    Anxiety 01/31/2018    Bipolar disord, crnt epsd depress, severe, w psych features ([facility] [address] ) 01/31/2018    Gastroesophageal reflux disease 01/31/2018    Polysubstance abuse ([facility] [address] ) 08/01/2017     He  has no past surgical history on file  His family history is not on file  He  reports that he has been smoking    He uses smokeless tobacco  He reports that he uses drugs, including Marijuana  He reports that he does not drink alcohol  Current Outpatient Prescriptions   Medication Sig Dispense Refill    clonazePAM (KlonoPIN) 0 5 mg tablet Take 0 5 mg by mouth daily at bedtime  0    escitalopram (LEXAPRO) 20 mg tablet every 24 hours      hydrOXYzine HCL (ATARAX) 25 mg tablet TAKE 1 TABLET (25 MG TOTAL) BY MOUTH EVERY 4 (FOUR) HOURS AS NEEDED FOR ANXIETY  0    phenytoin extended (DILANTIN) 200 MG ER capsule Take 400 mg by mouth 2 (two) times a day      prazosin (MINIPRESS) 2 mg capsule Take 2 mg by mouth 2 (two) times a day  0    QUEtiapine (SEROquel) 100 mg tablet Take 100 mg by mouth daily at bedtime  0     No current facility-administered medications for this visit  Current Outpatient Prescriptions on File Prior to Visit   Medication Sig    escitalopram (LEXAPRO) 20 mg tablet every 24 hours    phenytoin extended (DILANTIN) 200 MG ER capsule Take 400 mg by mouth 2 (two) times a day     No current facility-administered medications on file prior to visit  He is allergic to no active allergies       Review of Systems   Constitutional: Negative for chills, fever and unexpected weight change  HENT: Negative for congestion, ear discharge, ear pain, sore throat and trouble swallowing  Eyes: Negative for visual disturbance  Respiratory: Negative for cough, shortness of breath and wheezing  Cardiovascular: Negative for chest pain, palpitations and leg swelling  Gastrointestinal: Negative for abdominal pain, blood in stool, constipation, diarrhea, nausea and vomiting  Genitourinary: Negative for dysuria and hematuria  Musculoskeletal: Positive for arthralgias  Negative for back pain and myalgias  Skin: Negative for rash  Allergic/Immunologic: Negative for immunocompromised state  Neurological: Negative for seizures (No breakthrough seizures), weakness and headaches  Hematological: Negative for adenopathy  Psychiatric/Behavioral: Positive for dysphoric mood and sleep disturbance  Negative for self-injury and suicidal ideas  The patient is not nervous/anxious  Objective:      /78 (BP Location: Left arm, Patient Position: Sitting, Cuff Size: Standard)   Pulse 83   Temp (!) 97 2 °F (36 2 °C)   Resp 16   Wt 73 5 kg (162 lb)   SpO2 95%   BMI 23 92 kg/m²          Physical Exam   Constitutional: He is oriented to person, place, and time  He appears well-developed and well-nourished  No distress  HENT:   Head: Normocephalic and atraumatic  Right Ear: External ear normal    Left Ear: External ear normal    Mouth/Throat: No oropharyngeal exudate  Eyes: Pupils are equal, round, and reactive to light  Conjunctivae and EOM are normal  Right eye exhibits no discharge  Left eye exhibits no discharge  No scleral icterus  Neck: Normal range of motion  No JVD present  Cardiovascular: Normal rate, regular rhythm, normal heart sounds and intact distal pulses  Exam reveals no gallop and no friction rub  No murmur heard  Pulmonary/Chest: Effort normal and breath sounds normal  No stridor  No respiratory distress  He has no wheezes  He has no rales  Abdominal: Soft  Bowel sounds are normal  He exhibits no distension  There is no tenderness  There is no rebound and no guarding  Musculoskeletal:        Left shoulder: He exhibits decreased range of motion (Decrease in AROM  Abduction and flexion upto 90  Painful PROM restricts testing ), tenderness and decreased strength (Limited by pain  )  Left elbow: Normal         Left wrist: Normal    Positive empty can test    Lymphadenopathy:     He has no cervical adenopathy  Neurological: He is alert and oriented to person, place, and time  He exhibits normal muscle tone  Skin: Skin is warm  No rash noted  He is not diaphoretic  Psychiatric: His affect is blunt  He expresses no homicidal and no suicidal ideation  Vitals reviewed

## 2019-03-25 ENCOUNTER — APPOINTMENT (EMERGENCY)
Dept: RADIOLOGY | Facility: HOSPITAL | Age: 44
DRG: 055 | End: 2019-03-25
Payer: COMMERCIAL

## 2019-03-25 ENCOUNTER — HOSPITAL ENCOUNTER (INPATIENT)
Facility: HOSPITAL | Age: 44
LOS: 6 days | Discharge: HOME | DRG: 055 | End: 2019-04-01
Attending: EMERGENCY MEDICINE | Admitting: SURGERY
Payer: COMMERCIAL

## 2019-03-25 DIAGNOSIS — I60.9 SAH (SUBARACHNOID HEMORRHAGE) (HCC): ICD-10-CM

## 2019-03-25 DIAGNOSIS — G40.409 GENERALIZED TONIC-CLONIC SEIZURE (HCC): ICD-10-CM

## 2019-03-25 DIAGNOSIS — N17.9 AKI (ACUTE KIDNEY INJURY) (HCC): ICD-10-CM

## 2019-03-25 DIAGNOSIS — R56.9 SEIZURES (HCC): ICD-10-CM

## 2019-03-25 DIAGNOSIS — S09.90XA HEAD INJURY: Primary | ICD-10-CM

## 2019-03-25 LAB — ETHANOL SERPL-MCNC: <3 MG/DL (ref 0–3)

## 2019-03-25 PROCEDURE — 72125 CT NECK SPINE W/O DYE: CPT

## 2019-03-25 PROCEDURE — 93005 ELECTROCARDIOGRAM TRACING: CPT

## 2019-03-25 PROCEDURE — 36415 COLL VENOUS BLD VENIPUNCTURE: CPT | Performed by: EMERGENCY MEDICINE

## 2019-03-25 PROCEDURE — 71045 X-RAY EXAM CHEST 1 VIEW: CPT

## 2019-03-25 PROCEDURE — 99291 CRITICAL CARE FIRST HOUR: CPT

## 2019-03-25 PROCEDURE — 70450 CT HEAD/BRAIN W/O DYE: CPT

## 2019-03-25 PROCEDURE — 80320 DRUG SCREEN QUANTALCOHOLS: CPT | Performed by: EMERGENCY MEDICINE

## 2019-03-25 PROCEDURE — 96375 TX/PRO/DX INJ NEW DRUG ADDON: CPT

## 2019-03-25 PROCEDURE — 80329 ANALGESICS NON-OPIOID 1 OR 2: CPT | Performed by: EMERGENCY MEDICINE

## 2019-03-25 PROCEDURE — 80048 BASIC METABOLIC PNL TOTAL CA: CPT | Performed by: EMERGENCY MEDICINE

## 2019-03-25 RX ORDER — ONDANSETRON 2 MG/ML
1 INJECTION INTRAMUSCULAR; INTRAVENOUS ONCE
Status: COMPLETED | OUTPATIENT
Start: 2019-03-25 | End: 2019-03-25

## 2019-03-25 RX ORDER — PHENYTOIN 50 MG/1
200 TABLET, CHEWABLE ORAL 2 TIMES DAILY
COMMUNITY
End: 2019-04-01 | Stop reason: HOSPADM

## 2019-03-25 RX ORDER — MIDAZOLAM HYDROCHLORIDE 1 MG/ML
1 INJECTION INTRAMUSCULAR; INTRAVENOUS ONCE
Status: COMPLETED | OUTPATIENT
Start: 2019-03-25 | End: 2019-03-25

## 2019-03-25 RX ORDER — FENTANYL CITRATE 50 UG/ML
25 INJECTION, SOLUTION INTRAMUSCULAR; INTRAVENOUS ONCE
Status: COMPLETED | OUTPATIENT
Start: 2019-03-25 | End: 2019-03-25

## 2019-03-25 RX ORDER — LORAZEPAM 2 MG/ML
INJECTION INTRAMUSCULAR
Status: DISPENSED
Start: 2019-03-25 | End: 2019-03-26

## 2019-03-25 RX ADMIN — FENTANYL CITRATE 25 MCG: 50 INJECTION, SOLUTION INTRAMUSCULAR; INTRAVENOUS at 23:38

## 2019-03-26 ENCOUNTER — APPOINTMENT (INPATIENT)
Dept: NEUROLOGY | Facility: AMBULATORY SURGERY CENTER | Age: 44
DRG: 055 | End: 2019-03-26
Payer: COMMERCIAL

## 2019-03-26 PROBLEM — I60.9 SAH (SUBARACHNOID HEMORRHAGE) (HCC): Status: ACTIVE | Noted: 2019-03-26

## 2019-03-26 PROBLEM — N17.9 AKI (ACUTE KIDNEY INJURY) (HCC): Status: ACTIVE | Noted: 2019-03-26

## 2019-03-26 PROBLEM — G40.409 GENERALIZED TONIC-CLONIC SEIZURE (HCC): Status: ACTIVE | Noted: 2019-03-26

## 2019-03-26 PROBLEM — S06.5X9A SUBDURAL HEMATOMA (HCC): Status: ACTIVE | Noted: 2019-03-26

## 2019-03-26 PROBLEM — S06.5XAA SUBDURAL HEMATOMA: Status: ACTIVE | Noted: 2019-03-26

## 2019-03-26 LAB
ALBUMIN SERPL BCP-MCNC: 4.2 G/DL (ref 3.5–5)
ALP SERPL-CCNC: 112 U/L (ref 46–116)
ALT SERPL W P-5'-P-CCNC: 31 U/L (ref 12–78)
ANION GAP SERPL CALCULATED.3IONS-SCNC: 12 MMOL/L (ref 4–13)
ANION GAP SERPL CALCULATED.3IONS-SCNC: 32 MMOL/L (ref 4–13)
ANION GAP SERPL CALCULATED.3IONS-SCNC: 9 MMOL/L (ref 4–13)
APAP SERPL-MCNC: <2 UG/ML (ref 10–30)
APTT PPP: 27 SECONDS (ref 26–38)
AST SERPL W P-5'-P-CCNC: 25 U/L (ref 5–45)
ATRIAL RATE: 126 BPM
BASOPHILS # BLD AUTO: 0.11 THOUSANDS/ΜL (ref 0–0.1)
BASOPHILS NFR BLD AUTO: 1 % (ref 0–1)
BILIRUB SERPL-MCNC: 0.17 MG/DL (ref 0.2–1)
BUN SERPL-MCNC: 14 MG/DL (ref 5–25)
BUN SERPL-MCNC: 15 MG/DL (ref 5–25)
BUN SERPL-MCNC: 17 MG/DL (ref 5–25)
CALCIUM SERPL-MCNC: 10.2 MG/DL (ref 8.3–10.1)
CALCIUM SERPL-MCNC: 7.9 MG/DL (ref 8.3–10.1)
CALCIUM SERPL-MCNC: 8.3 MG/DL (ref 8.3–10.1)
CHLORIDE SERPL-SCNC: 104 MMOL/L (ref 100–108)
CHLORIDE SERPL-SCNC: 111 MMOL/L (ref 100–108)
CHLORIDE SERPL-SCNC: 112 MMOL/L (ref 100–108)
CK MB SERPL-MCNC: 1.1 NG/ML (ref 0–5)
CK MB SERPL-MCNC: <1 % (ref 0–2.5)
CK SERPL-CCNC: 243 U/L (ref 39–308)
CO2 SERPL-SCNC: 17 MMOL/L (ref 21–32)
CO2 SERPL-SCNC: 20 MMOL/L (ref 21–32)
CO2 SERPL-SCNC: 5 MMOL/L (ref 21–32)
CREAT SERPL-MCNC: 1.7 MG/DL (ref 0.6–1.3)
CREAT SERPL-MCNC: 1.77 MG/DL (ref 0.6–1.3)
CREAT SERPL-MCNC: 1.97 MG/DL (ref 0.6–1.3)
EOSINOPHIL # BLD AUTO: 0.35 THOUSAND/ΜL (ref 0–0.61)
EOSINOPHIL NFR BLD AUTO: 2 % (ref 0–6)
ERYTHROCYTE [DISTWIDTH] IN BLOOD BY AUTOMATED COUNT: 12.6 % (ref 11.6–15.1)
ERYTHROCYTE [DISTWIDTH] IN BLOOD BY AUTOMATED COUNT: 12.7 % (ref 11.6–15.1)
GFR SERPL CREATININE-BSD FRML MDRD: 41 ML/MIN/1.73SQ M
GFR SERPL CREATININE-BSD FRML MDRD: 47 ML/MIN/1.73SQ M
GFR SERPL CREATININE-BSD FRML MDRD: 49 ML/MIN/1.73SQ M
GLUCOSE SERPL-MCNC: 101 MG/DL (ref 65–140)
GLUCOSE SERPL-MCNC: 131 MG/DL (ref 65–140)
GLUCOSE SERPL-MCNC: 191 MG/DL (ref 65–140)
GLUCOSE SERPL-MCNC: 98 MG/DL (ref 65–140)
GLUCOSE SERPL-MCNC: 99 MG/DL (ref 65–140)
GLUCOSE SERPL-MCNC: 99 MG/DL (ref 65–140)
HCT VFR BLD AUTO: 43.7 % (ref 36.5–49.3)
HCT VFR BLD AUTO: 55.1 % (ref 36.5–49.3)
HGB BLD-MCNC: 14.5 G/DL (ref 12–17)
HGB BLD-MCNC: 16.6 G/DL (ref 12–17)
IMM GRANULOCYTES # BLD AUTO: 0.39 THOUSAND/UL (ref 0–0.2)
IMM GRANULOCYTES NFR BLD AUTO: 2 % (ref 0–2)
INR PPP: 1.38 (ref 0.86–1.17)
LYMPHOCYTES # BLD AUTO: 5.48 THOUSANDS/ΜL (ref 0.6–4.47)
LYMPHOCYTES NFR BLD AUTO: 28 % (ref 14–44)
MAGNESIUM SERPL-MCNC: 3.3 MG/DL (ref 1.6–2.6)
MCH RBC QN AUTO: 28.3 PG (ref 26.8–34.3)
MCH RBC QN AUTO: 28.4 PG (ref 26.8–34.3)
MCHC RBC AUTO-ENTMCNC: 30.1 G/DL (ref 31.4–37.4)
MCHC RBC AUTO-ENTMCNC: 33.2 G/DL (ref 31.4–37.4)
MCV RBC AUTO: 85 FL (ref 82–98)
MCV RBC AUTO: 94 FL (ref 82–98)
MONOCYTES # BLD AUTO: 1.15 THOUSAND/ΜL (ref 0.17–1.22)
MONOCYTES NFR BLD AUTO: 6 % (ref 4–12)
NEUTROPHILS # BLD AUTO: 12.04 THOUSANDS/ΜL (ref 1.85–7.62)
NEUTS SEG NFR BLD AUTO: 61 % (ref 43–75)
NRBC BLD AUTO-RTO: 0 /100 WBCS
P AXIS: 74 DEGREES
PHENYTOIN SERPL-MCNC: 3.3 UG/ML (ref 10–20)
PHOSPHATE SERPL-MCNC: 1.9 MG/DL (ref 2.7–4.5)
PLATELET # BLD AUTO: 232 THOUSANDS/UL (ref 149–390)
PLATELET # BLD AUTO: 331 THOUSANDS/UL (ref 149–390)
PMV BLD AUTO: 11.5 FL (ref 8.9–12.7)
PMV BLD AUTO: 11.5 FL (ref 8.9–12.7)
POTASSIUM SERPL-SCNC: 3.7 MMOL/L (ref 3.5–5.3)
POTASSIUM SERPL-SCNC: 4 MMOL/L (ref 3.5–5.3)
POTASSIUM SERPL-SCNC: 4.6 MMOL/L (ref 3.5–5.3)
PR INTERVAL: 136 MS
PROT SERPL-MCNC: 7.5 G/DL (ref 6.4–8.2)
PROTHROMBIN TIME: 17.1 SECONDS (ref 11.8–14.2)
QRS AXIS: 77 DEGREES
QRSD INTERVAL: 84 MS
QT INTERVAL: 304 MS
QTC INTERVAL: 440 MS
RBC # BLD AUTO: 5.12 MILLION/UL (ref 3.88–5.62)
RBC # BLD AUTO: 5.85 MILLION/UL (ref 3.88–5.62)
SALICYLATES SERPL-MCNC: 4 MG/DL (ref 3–20)
SODIUM SERPL-SCNC: 140 MMOL/L (ref 136–145)
SODIUM SERPL-SCNC: 141 MMOL/L (ref 136–145)
SODIUM SERPL-SCNC: 141 MMOL/L (ref 136–145)
T WAVE AXIS: 66 DEGREES
VENTRICULAR RATE: 126 BPM
WBC # BLD AUTO: 19.52 THOUSAND/UL (ref 4.31–10.16)
WBC # BLD AUTO: 21.82 THOUSAND/UL (ref 4.31–10.16)

## 2019-03-26 PROCEDURE — 82553 CREATINE MB FRACTION: CPT | Performed by: PHYSICIAN ASSISTANT

## 2019-03-26 PROCEDURE — 90715 TDAP VACCINE 7 YRS/> IM: CPT | Performed by: EMERGENCY MEDICINE

## 2019-03-26 PROCEDURE — 97163 PT EVAL HIGH COMPLEX 45 MIN: CPT

## 2019-03-26 PROCEDURE — G8988 SELF CARE GOAL STATUS: HCPCS

## 2019-03-26 PROCEDURE — 96365 THER/PROPH/DIAG IV INF INIT: CPT

## 2019-03-26 PROCEDURE — 80185 ASSAY OF PHENYTOIN TOTAL: CPT | Performed by: EMERGENCY MEDICINE

## 2019-03-26 PROCEDURE — 85027 COMPLETE CBC AUTOMATED: CPT | Performed by: EMERGENCY MEDICINE

## 2019-03-26 PROCEDURE — 80048 BASIC METABOLIC PNL TOTAL CA: CPT | Performed by: EMERGENCY MEDICINE

## 2019-03-26 PROCEDURE — 93010 ELECTROCARDIOGRAM REPORT: CPT | Performed by: INTERNAL MEDICINE

## 2019-03-26 PROCEDURE — 96375 TX/PRO/DX INJ NEW DRUG ADDON: CPT

## 2019-03-26 PROCEDURE — 96376 TX/PRO/DX INJ SAME DRUG ADON: CPT

## 2019-03-26 PROCEDURE — 85610 PROTHROMBIN TIME: CPT | Performed by: EMERGENCY MEDICINE

## 2019-03-26 PROCEDURE — 80053 COMPREHEN METABOLIC PANEL: CPT | Performed by: EMERGENCY MEDICINE

## 2019-03-26 PROCEDURE — 82550 ASSAY OF CK (CPK): CPT | Performed by: EMERGENCY MEDICINE

## 2019-03-26 PROCEDURE — 99255 IP/OBS CONSLTJ NEW/EST HI 80: CPT | Performed by: NEUROLOGICAL SURGERY

## 2019-03-26 PROCEDURE — 97166 OT EVAL MOD COMPLEX 45 MIN: CPT

## 2019-03-26 PROCEDURE — 85730 THROMBOPLASTIN TIME PARTIAL: CPT | Performed by: EMERGENCY MEDICINE

## 2019-03-26 PROCEDURE — 82550 ASSAY OF CK (CPK): CPT | Performed by: PHYSICIAN ASSISTANT

## 2019-03-26 PROCEDURE — NC001 PR NO CHARGE: Performed by: NEUROLOGICAL SURGERY

## 2019-03-26 PROCEDURE — 99223 1ST HOSP IP/OBS HIGH 75: CPT | Performed by: SURGERY

## 2019-03-26 PROCEDURE — 84100 ASSAY OF PHOSPHORUS: CPT | Performed by: EMERGENCY MEDICINE

## 2019-03-26 PROCEDURE — G8979 MOBILITY GOAL STATUS: HCPCS

## 2019-03-26 PROCEDURE — 82553 CREATINE MB FRACTION: CPT | Performed by: EMERGENCY MEDICINE

## 2019-03-26 PROCEDURE — 95951 HB EEG MONITORING/VIDEORECORD: CPT

## 2019-03-26 PROCEDURE — 85025 COMPLETE CBC W/AUTO DIFF WBC: CPT | Performed by: EMERGENCY MEDICINE

## 2019-03-26 PROCEDURE — 99223 1ST HOSP IP/OBS HIGH 75: CPT | Performed by: PSYCHIATRY & NEUROLOGY

## 2019-03-26 PROCEDURE — 82948 REAGENT STRIP/BLOOD GLUCOSE: CPT

## 2019-03-26 PROCEDURE — 83735 ASSAY OF MAGNESIUM: CPT | Performed by: EMERGENCY MEDICINE

## 2019-03-26 PROCEDURE — 36415 COLL VENOUS BLD VENIPUNCTURE: CPT | Performed by: EMERGENCY MEDICINE

## 2019-03-26 PROCEDURE — G8978 MOBILITY CURRENT STATUS: HCPCS

## 2019-03-26 PROCEDURE — G8987 SELF CARE CURRENT STATUS: HCPCS

## 2019-03-26 RX ORDER — METHOCARBAMOL 500 MG/1
500 TABLET, FILM COATED ORAL EVERY 6 HOURS SCHEDULED
Status: DISCONTINUED | OUTPATIENT
Start: 2019-03-26 | End: 2019-03-27

## 2019-03-26 RX ORDER — METOCLOPRAMIDE HYDROCHLORIDE 5 MG/ML
10 INJECTION INTRAMUSCULAR; INTRAVENOUS EVERY 6 HOURS PRN
Status: DISCONTINUED | OUTPATIENT
Start: 2019-03-26 | End: 2019-03-27

## 2019-03-26 RX ORDER — ACETAMINOPHEN 325 MG/1
650 TABLET ORAL EVERY 6 HOURS PRN
Status: DISCONTINUED | OUTPATIENT
Start: 2019-03-26 | End: 2019-03-27

## 2019-03-26 RX ORDER — OXYCODONE HYDROCHLORIDE 5 MG/1
5 TABLET ORAL EVERY 4 HOURS PRN
Status: DISCONTINUED | OUTPATIENT
Start: 2019-03-26 | End: 2019-04-01 | Stop reason: HOSPADM

## 2019-03-26 RX ORDER — LIDOCAINE 50 MG/G
1 PATCH TOPICAL DAILY
Status: DISCONTINUED | OUTPATIENT
Start: 2019-03-26 | End: 2019-04-01 | Stop reason: HOSPADM

## 2019-03-26 RX ORDER — PROMETHAZINE HYDROCHLORIDE 25 MG/ML
25 INJECTION, SOLUTION INTRAMUSCULAR; INTRAVENOUS ONCE
Status: COMPLETED | OUTPATIENT
Start: 2019-03-26 | End: 2019-03-26

## 2019-03-26 RX ORDER — SODIUM CHLORIDE, SODIUM GLUCONATE, SODIUM ACETATE, POTASSIUM CHLORIDE, MAGNESIUM CHLORIDE, SODIUM PHOSPHATE, DIBASIC, AND POTASSIUM PHOSPHATE .53; .5; .37; .037; .03; .012; .00082 G/100ML; G/100ML; G/100ML; G/100ML; G/100ML; G/100ML; G/100ML
1000 INJECTION, SOLUTION INTRAVENOUS ONCE
Status: COMPLETED | OUTPATIENT
Start: 2019-03-26 | End: 2019-03-26

## 2019-03-26 RX ORDER — METOCLOPRAMIDE HYDROCHLORIDE 5 MG/ML
10 INJECTION INTRAMUSCULAR; INTRAVENOUS EVERY 6 HOURS PRN
Status: DISCONTINUED | OUTPATIENT
Start: 2019-03-26 | End: 2019-03-26

## 2019-03-26 RX ORDER — LORAZEPAM 2 MG/ML
2 INJECTION INTRAMUSCULAR EVERY 6 HOURS PRN
Status: DISCONTINUED | OUTPATIENT
Start: 2019-03-26 | End: 2019-04-01 | Stop reason: HOSPADM

## 2019-03-26 RX ORDER — SODIUM CHLORIDE, SODIUM GLUCONATE, SODIUM ACETATE, POTASSIUM CHLORIDE, MAGNESIUM CHLORIDE, SODIUM PHOSPHATE, DIBASIC, AND POTASSIUM PHOSPHATE .53; .5; .37; .037; .03; .012; .00082 G/100ML; G/100ML; G/100ML; G/100ML; G/100ML; G/100ML; G/100ML
125 INJECTION, SOLUTION INTRAVENOUS CONTINUOUS
Status: DISCONTINUED | OUTPATIENT
Start: 2019-03-26 | End: 2019-03-26

## 2019-03-26 RX ORDER — OXYCODONE HYDROCHLORIDE 10 MG/1
10 TABLET ORAL EVERY 4 HOURS PRN
Status: DISCONTINUED | OUTPATIENT
Start: 2019-03-26 | End: 2019-04-01 | Stop reason: HOSPADM

## 2019-03-26 RX ORDER — SODIUM CHLORIDE, SODIUM GLUCONATE, SODIUM ACETATE, POTASSIUM CHLORIDE, MAGNESIUM CHLORIDE, SODIUM PHOSPHATE, DIBASIC, AND POTASSIUM PHOSPHATE .53; .5; .37; .037; .03; .012; .00082 G/100ML; G/100ML; G/100ML; G/100ML; G/100ML; G/100ML; G/100ML
100 INJECTION, SOLUTION INTRAVENOUS CONTINUOUS
Status: DISCONTINUED | OUTPATIENT
Start: 2019-03-26 | End: 2019-03-27

## 2019-03-26 RX ORDER — PHENYTOIN SODIUM 100 MG/1
400 CAPSULE, EXTENDED RELEASE ORAL EVERY 12 HOURS SCHEDULED
Status: DISCONTINUED | OUTPATIENT
Start: 2019-03-26 | End: 2019-04-01 | Stop reason: HOSPADM

## 2019-03-26 RX ORDER — ONDANSETRON 2 MG/ML
4 INJECTION INTRAMUSCULAR; INTRAVENOUS EVERY 4 HOURS PRN
Status: DISCONTINUED | OUTPATIENT
Start: 2019-03-26 | End: 2019-04-01 | Stop reason: HOSPADM

## 2019-03-26 RX ADMIN — SODIUM CHLORIDE 1325 MG PE: 9 INJECTION, SOLUTION INTRAVENOUS at 12:38

## 2019-03-26 RX ADMIN — LIDOCAINE 1 PATCH: 50 PATCH CUTANEOUS at 14:09

## 2019-03-26 RX ADMIN — POTASSIUM PHOSPHATE, MONOBASIC AND POTASSIUM PHOSPHATE, DIBASIC 21 MMOL: 224; 236 INJECTION, SOLUTION INTRAVENOUS at 09:03

## 2019-03-26 RX ADMIN — OXYCODONE HYDROCHLORIDE 10 MG: 10 TABLET ORAL at 21:06

## 2019-03-26 RX ADMIN — SODIUM CHLORIDE, SODIUM GLUCONATE, SODIUM ACETATE, POTASSIUM CHLORIDE, MAGNESIUM CHLORIDE, SODIUM PHOSPHATE, DIBASIC, AND POTASSIUM PHOSPHATE 1000 ML: .53; .5; .37; .037; .03; .012; .00082 INJECTION, SOLUTION INTRAVENOUS at 01:12

## 2019-03-26 RX ADMIN — ONDANSETRON 4 MG: 2 INJECTION INTRAMUSCULAR; INTRAVENOUS at 06:51

## 2019-03-26 RX ADMIN — METHOCARBAMOL 500 MG: 500 TABLET, FILM COATED ORAL at 11:07

## 2019-03-26 RX ADMIN — PROMETHAZINE HYDROCHLORIDE 25 MG: 25 INJECTION INTRAMUSCULAR; INTRAVENOUS at 07:32

## 2019-03-26 RX ADMIN — METHOCARBAMOL 500 MG: 500 TABLET, FILM COATED ORAL at 17:48

## 2019-03-26 RX ADMIN — SODIUM CHLORIDE, SODIUM GLUCONATE, SODIUM ACETATE, POTASSIUM CHLORIDE, MAGNESIUM CHLORIDE, SODIUM PHOSPHATE, DIBASIC, AND POTASSIUM PHOSPHATE 100 ML/HR: .53; .5; .37; .037; .03; .012; .00082 INJECTION, SOLUTION INTRAVENOUS at 14:53

## 2019-03-26 RX ADMIN — OXYCODONE HYDROCHLORIDE 10 MG: 10 TABLET ORAL at 06:51

## 2019-03-26 RX ADMIN — PHENYTOIN SODIUM 400 MG: 100 CAPSULE ORAL at 21:06

## 2019-03-26 RX ADMIN — ACETAMINOPHEN 650 MG: 325 TABLET ORAL at 09:02

## 2019-03-26 RX ADMIN — TETANUS TOXOID, REDUCED DIPHTHERIA TOXOID AND ACELLULAR PERTUSSIS VACCINE, ADSORBED 0.5 ML: 5; 2.5; 8; 8; 2.5 SUSPENSION INTRAMUSCULAR at 01:35

## 2019-03-26 RX ADMIN — ONDANSETRON 4 MG: 2 INJECTION INTRAMUSCULAR; INTRAVENOUS at 01:35

## 2019-03-26 RX ADMIN — METOCLOPRAMIDE 10 MG: 5 INJECTION, SOLUTION INTRAMUSCULAR; INTRAVENOUS at 16:29

## 2019-03-26 RX ADMIN — SODIUM CHLORIDE, SODIUM GLUCONATE, SODIUM ACETATE, POTASSIUM CHLORIDE, MAGNESIUM CHLORIDE, SODIUM PHOSPHATE, DIBASIC, AND POTASSIUM PHOSPHATE 125 ML/HR: .53; .5; .37; .037; .03; .012; .00082 INJECTION, SOLUTION INTRAVENOUS at 00:58

## 2019-03-26 RX ADMIN — OXYCODONE HYDROCHLORIDE 10 MG: 10 TABLET ORAL at 16:29

## 2019-03-26 RX ADMIN — LEVETIRACETAM 1000 MG: 100 INJECTION, SOLUTION INTRAVENOUS at 01:35

## 2019-03-26 RX ADMIN — OXYCODONE HYDROCHLORIDE 10 MG: 10 TABLET ORAL at 12:37

## 2019-03-26 RX ADMIN — ONDANSETRON 4 MG: 2 INJECTION INTRAMUSCULAR; INTRAVENOUS at 12:49

## 2019-03-26 RX ADMIN — LEVETIRACETAM 1000 MG: 100 INJECTION, SOLUTION INTRAVENOUS at 00:04

## 2019-03-26 RX ADMIN — ACETAMINOPHEN 650 MG: 325 TABLET ORAL at 14:09

## 2019-03-27 ENCOUNTER — APPOINTMENT (INPATIENT)
Dept: RADIOLOGY | Facility: HOSPITAL | Age: 44
DRG: 055 | End: 2019-03-27
Payer: COMMERCIAL

## 2019-03-27 ENCOUNTER — APPOINTMENT (INPATIENT)
Dept: NEUROLOGY | Facility: AMBULATORY SURGERY CENTER | Age: 44
DRG: 055 | End: 2019-03-27
Payer: COMMERCIAL

## 2019-03-27 PROBLEM — S06.9X9A TRAUMATIC BRAIN INJURY (HCC): Status: ACTIVE | Noted: 2019-03-27

## 2019-03-27 PROBLEM — F31.9 BIPOLAR DISORDER (HCC): Status: ACTIVE | Noted: 2019-03-27

## 2019-03-27 PROBLEM — N18.9 CKD (CHRONIC KIDNEY DISEASE): Status: ACTIVE | Noted: 2019-03-26

## 2019-03-27 PROBLEM — S06.9XAA TRAUMATIC BRAIN INJURY: Status: ACTIVE | Noted: 2019-03-27

## 2019-03-27 PROBLEM — N17.9 ACUTE KIDNEY INJURY (HCC): Status: ACTIVE | Noted: 2019-03-27

## 2019-03-27 LAB
ALBUMIN SERPL BCP-MCNC: 3.2 G/DL (ref 3.5–5)
ALP SERPL-CCNC: 87 U/L (ref 46–116)
ALT SERPL W P-5'-P-CCNC: 22 U/L (ref 12–78)
ANION GAP SERPL CALCULATED.3IONS-SCNC: 10 MMOL/L (ref 4–13)
ANION GAP SERPL CALCULATED.3IONS-SCNC: 7 MMOL/L (ref 4–13)
AST SERPL W P-5'-P-CCNC: 42 U/L (ref 5–45)
BACTERIA UR QL AUTO: ABNORMAL /HPF
BILIRUB SERPL-MCNC: 0.54 MG/DL (ref 0.2–1)
BILIRUB UR QL STRIP: NEGATIVE
BUN SERPL-MCNC: 17 MG/DL (ref 5–25)
BUN SERPL-MCNC: 19 MG/DL (ref 5–25)
CALCIUM SERPL-MCNC: 7.9 MG/DL (ref 8.3–10.1)
CALCIUM SERPL-MCNC: 8 MG/DL (ref 8.3–10.1)
CHLORIDE SERPL-SCNC: 105 MMOL/L (ref 100–108)
CHLORIDE SERPL-SCNC: 111 MMOL/L (ref 100–108)
CK MB SERPL-MCNC: 2.5 NG/ML (ref 0–5)
CK MB SERPL-MCNC: <1 % (ref 0–2.5)
CK SERPL-CCNC: 3856 U/L (ref 39–308)
CLARITY UR: CLEAR
CO2 SERPL-SCNC: 22 MMOL/L (ref 21–32)
CO2 SERPL-SCNC: 23 MMOL/L (ref 21–32)
COLOR UR: YELLOW
CREAT SERPL-MCNC: 3.33 MG/DL (ref 0.6–1.3)
CREAT SERPL-MCNC: 3.51 MG/DL (ref 0.6–1.3)
CREAT UR-MCNC: 27.5 MG/DL
GFR SERPL CREATININE-BSD FRML MDRD: 20 ML/MIN/1.73SQ M
GFR SERPL CREATININE-BSD FRML MDRD: 21 ML/MIN/1.73SQ M
GLUCOSE SERPL-MCNC: 110 MG/DL (ref 65–140)
GLUCOSE SERPL-MCNC: 114 MG/DL (ref 65–140)
GLUCOSE UR STRIP-MCNC: NEGATIVE MG/DL
HGB UR QL STRIP.AUTO: ABNORMAL
HYALINE CASTS #/AREA URNS LPF: ABNORMAL /LPF
KETONES UR STRIP-MCNC: NEGATIVE MG/DL
LEUKOCYTE ESTERASE UR QL STRIP: NEGATIVE
NITRITE UR QL STRIP: NEGATIVE
NON-SQ EPI CELLS URNS QL MICRO: ABNORMAL /HPF
PH UR STRIP.AUTO: 6.5 [PH]
PHENYTOIN SERPL-MCNC: 13.2 UG/ML (ref 10–20)
POTASSIUM SERPL-SCNC: 3.7 MMOL/L (ref 3.5–5.3)
POTASSIUM SERPL-SCNC: 3.7 MMOL/L (ref 3.5–5.3)
PROT SERPL-MCNC: 6 G/DL (ref 6.4–8.2)
PROT UR STRIP-MCNC: NEGATIVE MG/DL
RBC #/AREA URNS AUTO: ABNORMAL /HPF
SODIUM 24H UR-SCNC: 51 MOL/L
SODIUM SERPL-SCNC: 137 MMOL/L (ref 136–145)
SODIUM SERPL-SCNC: 141 MMOL/L (ref 136–145)
SP GR UR STRIP.AUTO: 1.01 (ref 1–1.03)
UROBILINOGEN UR QL STRIP.AUTO: 0.2 E.U./DL
WBC #/AREA URNS AUTO: ABNORMAL /HPF

## 2019-03-27 PROCEDURE — 84300 ASSAY OF URINE SODIUM: CPT | Performed by: INTERNAL MEDICINE

## 2019-03-27 PROCEDURE — 80053 COMPREHEN METABOLIC PANEL: CPT | Performed by: PHYSICIAN ASSISTANT

## 2019-03-27 PROCEDURE — 95951 HB EEG MONITORING/VIDEORECORD: CPT

## 2019-03-27 PROCEDURE — 80185 ASSAY OF PHENYTOIN TOTAL: CPT | Performed by: PHYSICIAN ASSISTANT

## 2019-03-27 PROCEDURE — 82553 CREATINE MB FRACTION: CPT | Performed by: PHYSICIAN ASSISTANT

## 2019-03-27 PROCEDURE — 99233 SBSQ HOSP IP/OBS HIGH 50: CPT | Performed by: PSYCHIATRY & NEUROLOGY

## 2019-03-27 PROCEDURE — 99232 SBSQ HOSP IP/OBS MODERATE 35: CPT | Performed by: NEUROLOGICAL SURGERY

## 2019-03-27 PROCEDURE — 70450 CT HEAD/BRAIN W/O DYE: CPT

## 2019-03-27 PROCEDURE — 80048 BASIC METABOLIC PNL TOTAL CA: CPT | Performed by: PHYSICIAN ASSISTANT

## 2019-03-27 PROCEDURE — NC001 PR NO CHARGE: Performed by: NEUROLOGICAL SURGERY

## 2019-03-27 PROCEDURE — 76770 US EXAM ABDO BACK WALL COMP: CPT

## 2019-03-27 PROCEDURE — 95951 PR EEG MONITORING/VIDEORECORD: CPT | Performed by: PSYCHIATRY & NEUROLOGY

## 2019-03-27 PROCEDURE — 99232 SBSQ HOSP IP/OBS MODERATE 35: CPT | Performed by: SURGERY

## 2019-03-27 PROCEDURE — 82550 ASSAY OF CK (CPK): CPT | Performed by: PHYSICIAN ASSISTANT

## 2019-03-27 PROCEDURE — G0515 COGNITIVE SKILLS DEVELOPMENT: HCPCS

## 2019-03-27 PROCEDURE — 81001 URINALYSIS AUTO W/SCOPE: CPT | Performed by: INTERNAL MEDICINE

## 2019-03-27 PROCEDURE — 82570 ASSAY OF URINE CREATININE: CPT | Performed by: INTERNAL MEDICINE

## 2019-03-27 PROCEDURE — 99254 IP/OBS CNSLTJ NEW/EST MOD 60: CPT | Performed by: INTERNAL MEDICINE

## 2019-03-27 PROCEDURE — 97530 THERAPEUTIC ACTIVITIES: CPT

## 2019-03-27 RX ORDER — SODIUM CHLORIDE, SODIUM GLUCONATE, SODIUM ACETATE, POTASSIUM CHLORIDE, MAGNESIUM CHLORIDE, SODIUM PHOSPHATE, DIBASIC, AND POTASSIUM PHOSPHATE .53; .5; .37; .037; .03; .012; .00082 G/100ML; G/100ML; G/100ML; G/100ML; G/100ML; G/100ML; G/100ML
150 INJECTION, SOLUTION INTRAVENOUS CONTINUOUS
Status: DISCONTINUED | OUTPATIENT
Start: 2019-03-27 | End: 2019-04-01

## 2019-03-27 RX ORDER — DOCUSATE SODIUM 100 MG/1
100 CAPSULE, LIQUID FILLED ORAL 2 TIMES DAILY
Status: DISCONTINUED | OUTPATIENT
Start: 2019-03-27 | End: 2019-04-01 | Stop reason: HOSPADM

## 2019-03-27 RX ORDER — SENNOSIDES 8.6 MG
2 TABLET ORAL
Status: DISCONTINUED | OUTPATIENT
Start: 2019-03-27 | End: 2019-04-01 | Stop reason: HOSPADM

## 2019-03-27 RX ORDER — ACETAMINOPHEN 325 MG/1
975 TABLET ORAL EVERY 8 HOURS SCHEDULED
Status: DISCONTINUED | OUTPATIENT
Start: 2019-03-27 | End: 2019-04-01 | Stop reason: HOSPADM

## 2019-03-27 RX ORDER — METHOCARBAMOL 750 MG/1
750 TABLET, FILM COATED ORAL EVERY 6 HOURS SCHEDULED
Status: DISCONTINUED | OUTPATIENT
Start: 2019-03-27 | End: 2019-04-01 | Stop reason: HOSPADM

## 2019-03-27 RX ADMIN — SODIUM CHLORIDE, SODIUM GLUCONATE, SODIUM ACETATE, POTASSIUM CHLORIDE, MAGNESIUM CHLORIDE, SODIUM PHOSPHATE, DIBASIC, AND POTASSIUM PHOSPHATE 100 ML/HR: .53; .5; .37; .037; .03; .012; .00082 INJECTION, SOLUTION INTRAVENOUS at 00:54

## 2019-03-27 RX ADMIN — METHOCARBAMOL 750 MG: 750 TABLET, FILM COATED ORAL at 17:06

## 2019-03-27 RX ADMIN — POTASSIUM PHOSPHATE, MONOBASIC AND POTASSIUM PHOSPHATE, DIBASIC 21 MMOL: 224; 236 INJECTION, SOLUTION INTRAVENOUS at 09:00

## 2019-03-27 RX ADMIN — METHOCARBAMOL 500 MG: 500 TABLET, FILM COATED ORAL at 06:29

## 2019-03-27 RX ADMIN — METHOCARBAMOL 750 MG: 750 TABLET, FILM COATED ORAL at 23:59

## 2019-03-27 RX ADMIN — METHOCARBAMOL 500 MG: 500 TABLET, FILM COATED ORAL at 00:48

## 2019-03-27 RX ADMIN — METHOCARBAMOL 750 MG: 750 TABLET, FILM COATED ORAL at 11:30

## 2019-03-27 RX ADMIN — PHENYTOIN SODIUM 400 MG: 100 CAPSULE ORAL at 22:30

## 2019-03-27 RX ADMIN — LIDOCAINE 1 PATCH: 50 PATCH CUTANEOUS at 09:17

## 2019-03-27 RX ADMIN — SODIUM CHLORIDE, SODIUM GLUCONATE, SODIUM ACETATE, POTASSIUM CHLORIDE, MAGNESIUM CHLORIDE, SODIUM PHOSPHATE, DIBASIC, AND POTASSIUM PHOSPHATE 100 ML/HR: .53; .5; .37; .037; .03; .012; .00082 INJECTION, SOLUTION INTRAVENOUS at 14:45

## 2019-03-27 RX ADMIN — SENNOSIDES 17.2 MG: 8.6 TABLET, FILM COATED ORAL at 21:05

## 2019-03-27 RX ADMIN — PHENYTOIN SODIUM 400 MG: 100 CAPSULE ORAL at 09:17

## 2019-03-27 RX ADMIN — OXYCODONE HYDROCHLORIDE 10 MG: 10 TABLET ORAL at 21:17

## 2019-03-27 RX ADMIN — ACETAMINOPHEN 975 MG: 325 TABLET ORAL at 21:05

## 2019-03-27 RX ADMIN — DOCUSATE SODIUM 100 MG: 100 CAPSULE, LIQUID FILLED ORAL at 17:06

## 2019-03-27 RX ADMIN — OXYCODONE HYDROCHLORIDE 5 MG: 5 TABLET ORAL at 06:37

## 2019-03-27 RX ADMIN — ACETAMINOPHEN 975 MG: 325 TABLET ORAL at 15:00

## 2019-03-27 RX ADMIN — OXYCODONE HYDROCHLORIDE 10 MG: 10 TABLET ORAL at 10:35

## 2019-03-27 RX ADMIN — DOCUSATE SODIUM 100 MG: 100 CAPSULE, LIQUID FILLED ORAL at 09:16

## 2019-03-28 PROBLEM — R74.8 ELEVATED CK: Status: ACTIVE | Noted: 2019-03-28

## 2019-03-28 LAB
ANION GAP SERPL CALCULATED.3IONS-SCNC: 7 MMOL/L (ref 4–13)
BASOPHILS # BLD AUTO: 0.03 THOUSANDS/ΜL (ref 0–0.1)
BASOPHILS NFR BLD AUTO: 0 % (ref 0–1)
BUN SERPL-MCNC: 15 MG/DL (ref 5–25)
CALCIUM SERPL-MCNC: 8 MG/DL (ref 8.3–10.1)
CHLORIDE SERPL-SCNC: 109 MMOL/L (ref 100–108)
CK MB SERPL-MCNC: 1.5 NG/ML (ref 0–5)
CK MB SERPL-MCNC: 2.5 NG/ML (ref 0–5)
CK MB SERPL-MCNC: <1 % (ref 0–2.5)
CK MB SERPL-MCNC: <1 % (ref 0–2.5)
CK SERPL-CCNC: 357 U/L (ref 39–308)
CK SERPL-CCNC: 7686 U/L (ref 39–308)
CO2 SERPL-SCNC: 24 MMOL/L (ref 21–32)
CREAT SERPL-MCNC: 2.8 MG/DL (ref 0.6–1.3)
EOSINOPHIL # BLD AUTO: 0.36 THOUSAND/ΜL (ref 0–0.61)
EOSINOPHIL NFR BLD AUTO: 4 % (ref 0–6)
ERYTHROCYTE [DISTWIDTH] IN BLOOD BY AUTOMATED COUNT: 12.8 % (ref 11.6–15.1)
GFR SERPL CREATININE-BSD FRML MDRD: 26 ML/MIN/1.73SQ M
GLUCOSE SERPL-MCNC: 100 MG/DL (ref 65–140)
HCT VFR BLD AUTO: 41.2 % (ref 36.5–49.3)
HGB BLD-MCNC: 13.6 G/DL (ref 12–17)
IMM GRANULOCYTES # BLD AUTO: 0.04 THOUSAND/UL (ref 0–0.2)
IMM GRANULOCYTES NFR BLD AUTO: 1 % (ref 0–2)
LYMPHOCYTES # BLD AUTO: 1.96 THOUSANDS/ΜL (ref 0.6–4.47)
LYMPHOCYTES NFR BLD AUTO: 24 % (ref 14–44)
MCH RBC QN AUTO: 28.1 PG (ref 26.8–34.3)
MCHC RBC AUTO-ENTMCNC: 33 G/DL (ref 31.4–37.4)
MCV RBC AUTO: 85 FL (ref 82–98)
MONOCYTES # BLD AUTO: 0.86 THOUSAND/ΜL (ref 0.17–1.22)
MONOCYTES NFR BLD AUTO: 10 % (ref 4–12)
NEUTROPHILS # BLD AUTO: 5 THOUSANDS/ΜL (ref 1.85–7.62)
NEUTS SEG NFR BLD AUTO: 61 % (ref 43–75)
NRBC BLD AUTO-RTO: 0 /100 WBCS
PHOSPHATE SERPL-MCNC: 4.4 MG/DL (ref 2.7–4.5)
PLATELET # BLD AUTO: 172 THOUSANDS/UL (ref 149–390)
PMV BLD AUTO: 11.4 FL (ref 8.9–12.7)
POTASSIUM SERPL-SCNC: 3.7 MMOL/L (ref 3.5–5.3)
RBC # BLD AUTO: 4.84 MILLION/UL (ref 3.88–5.62)
SODIUM SERPL-SCNC: 140 MMOL/L (ref 136–145)
WBC # BLD AUTO: 8.25 THOUSAND/UL (ref 4.31–10.16)

## 2019-03-28 PROCEDURE — 99232 SBSQ HOSP IP/OBS MODERATE 35: CPT | Performed by: EMERGENCY MEDICINE

## 2019-03-28 PROCEDURE — NC001 PR NO CHARGE: Performed by: NEUROLOGICAL SURGERY

## 2019-03-28 PROCEDURE — 97530 THERAPEUTIC ACTIVITIES: CPT

## 2019-03-28 PROCEDURE — 82553 CREATINE MB FRACTION: CPT | Performed by: SURGERY

## 2019-03-28 PROCEDURE — 97116 GAIT TRAINING THERAPY: CPT

## 2019-03-28 PROCEDURE — 99232 SBSQ HOSP IP/OBS MODERATE 35: CPT | Performed by: NEUROLOGICAL SURGERY

## 2019-03-28 PROCEDURE — 95951 PR EEG MONITORING/VIDEORECORD: CPT | Performed by: PSYCHIATRY & NEUROLOGY

## 2019-03-28 PROCEDURE — 82550 ASSAY OF CK (CPK): CPT | Performed by: SURGERY

## 2019-03-28 PROCEDURE — 84100 ASSAY OF PHOSPHORUS: CPT | Performed by: PHYSICIAN ASSISTANT

## 2019-03-28 PROCEDURE — 99232 SBSQ HOSP IP/OBS MODERATE 35: CPT | Performed by: INTERNAL MEDICINE

## 2019-03-28 PROCEDURE — 85025 COMPLETE CBC W/AUTO DIFF WBC: CPT | Performed by: PHYSICIAN ASSISTANT

## 2019-03-28 PROCEDURE — 80048 BASIC METABOLIC PNL TOTAL CA: CPT | Performed by: PHYSICIAN ASSISTANT

## 2019-03-28 RX ORDER — HEPARIN SODIUM 5000 [USP'U]/ML
5000 INJECTION, SOLUTION INTRAVENOUS; SUBCUTANEOUS EVERY 8 HOURS SCHEDULED
Status: DISCONTINUED | OUTPATIENT
Start: 2019-03-28 | End: 2019-04-01 | Stop reason: HOSPADM

## 2019-03-28 RX ADMIN — SODIUM CHLORIDE, SODIUM GLUCONATE, SODIUM ACETATE, POTASSIUM CHLORIDE, MAGNESIUM CHLORIDE, SODIUM PHOSPHATE, DIBASIC, AND POTASSIUM PHOSPHATE 150 ML/HR: .53; .5; .37; .037; .03; .012; .00082 INJECTION, SOLUTION INTRAVENOUS at 23:53

## 2019-03-28 RX ADMIN — SODIUM CHLORIDE, SODIUM GLUCONATE, SODIUM ACETATE, POTASSIUM CHLORIDE, MAGNESIUM CHLORIDE, SODIUM PHOSPHATE, DIBASIC, AND POTASSIUM PHOSPHATE 100 ML/HR: .53; .5; .37; .037; .03; .012; .00082 INJECTION, SOLUTION INTRAVENOUS at 01:09

## 2019-03-28 RX ADMIN — SODIUM CHLORIDE, SODIUM GLUCONATE, SODIUM ACETATE, POTASSIUM CHLORIDE, MAGNESIUM CHLORIDE, SODIUM PHOSPHATE, DIBASIC, AND POTASSIUM PHOSPHATE 150 ML/HR: .53; .5; .37; .037; .03; .012; .00082 INJECTION, SOLUTION INTRAVENOUS at 17:13

## 2019-03-28 RX ADMIN — LIDOCAINE 1 PATCH: 50 PATCH CUTANEOUS at 09:19

## 2019-03-28 RX ADMIN — HEPARIN SODIUM 5000 UNITS: 5000 INJECTION INTRAVENOUS; SUBCUTANEOUS at 21:24

## 2019-03-28 RX ADMIN — PHENYTOIN SODIUM 400 MG: 100 CAPSULE ORAL at 11:13

## 2019-03-28 RX ADMIN — ACETAMINOPHEN 975 MG: 325 TABLET ORAL at 05:24

## 2019-03-28 RX ADMIN — ACETAMINOPHEN 975 MG: 325 TABLET ORAL at 14:07

## 2019-03-28 RX ADMIN — SENNOSIDES 17.2 MG: 8.6 TABLET, FILM COATED ORAL at 21:24

## 2019-03-28 RX ADMIN — METHOCARBAMOL 750 MG: 750 TABLET, FILM COATED ORAL at 23:52

## 2019-03-28 RX ADMIN — METHOCARBAMOL 750 MG: 750 TABLET, FILM COATED ORAL at 11:42

## 2019-03-28 RX ADMIN — PHENYTOIN SODIUM 400 MG: 100 CAPSULE ORAL at 21:24

## 2019-03-28 RX ADMIN — METHOCARBAMOL 750 MG: 750 TABLET, FILM COATED ORAL at 05:24

## 2019-03-28 RX ADMIN — METHOCARBAMOL 750 MG: 750 TABLET, FILM COATED ORAL at 17:13

## 2019-03-28 RX ADMIN — OXYCODONE HYDROCHLORIDE 10 MG: 10 TABLET ORAL at 09:21

## 2019-03-28 RX ADMIN — ACETAMINOPHEN 975 MG: 325 TABLET ORAL at 21:24

## 2019-03-28 RX ADMIN — HEPARIN SODIUM 5000 UNITS: 5000 INJECTION INTRAVENOUS; SUBCUTANEOUS at 14:07

## 2019-03-28 RX ADMIN — SODIUM CHLORIDE, SODIUM GLUCONATE, SODIUM ACETATE, POTASSIUM CHLORIDE, MAGNESIUM CHLORIDE, SODIUM PHOSPHATE, DIBASIC, AND POTASSIUM PHOSPHATE 150 ML/HR: .53; .5; .37; .037; .03; .012; .00082 INJECTION, SOLUTION INTRAVENOUS at 11:42

## 2019-03-29 LAB
ANION GAP SERPL CALCULATED.3IONS-SCNC: 5 MMOL/L (ref 4–13)
BASOPHILS # BLD AUTO: 0.03 THOUSANDS/ΜL (ref 0–0.1)
BASOPHILS NFR BLD AUTO: 0 % (ref 0–1)
BUN SERPL-MCNC: 11 MG/DL (ref 5–25)
CALCIUM SERPL-MCNC: 8.2 MG/DL (ref 8.3–10.1)
CHLORIDE SERPL-SCNC: 108 MMOL/L (ref 100–108)
CK MB SERPL-MCNC: 2.9 NG/ML (ref 0–5)
CK MB SERPL-MCNC: <1 % (ref 0–2.5)
CK SERPL-CCNC: ABNORMAL U/L (ref 39–308)
CO2 SERPL-SCNC: 26 MMOL/L (ref 21–32)
CREAT SERPL-MCNC: 2.05 MG/DL (ref 0.6–1.3)
EOSINOPHIL # BLD AUTO: 0.29 THOUSAND/ΜL (ref 0–0.61)
EOSINOPHIL NFR BLD AUTO: 4 % (ref 0–6)
ERYTHROCYTE [DISTWIDTH] IN BLOOD BY AUTOMATED COUNT: 12.5 % (ref 11.6–15.1)
GFR SERPL CREATININE-BSD FRML MDRD: 39 ML/MIN/1.73SQ M
GLUCOSE SERPL-MCNC: 97 MG/DL (ref 65–140)
HCT VFR BLD AUTO: 43.1 % (ref 36.5–49.3)
HGB BLD-MCNC: 14.3 G/DL (ref 12–17)
IMM GRANULOCYTES # BLD AUTO: 0.02 THOUSAND/UL (ref 0–0.2)
IMM GRANULOCYTES NFR BLD AUTO: 0 % (ref 0–2)
LYMPHOCYTES # BLD AUTO: 1.85 THOUSANDS/ΜL (ref 0.6–4.47)
LYMPHOCYTES NFR BLD AUTO: 26 % (ref 14–44)
MCH RBC QN AUTO: 28.3 PG (ref 26.8–34.3)
MCHC RBC AUTO-ENTMCNC: 33.2 G/DL (ref 31.4–37.4)
MCV RBC AUTO: 85 FL (ref 82–98)
MONOCYTES # BLD AUTO: 0.59 THOUSAND/ΜL (ref 0.17–1.22)
MONOCYTES NFR BLD AUTO: 8 % (ref 4–12)
NEUTROPHILS # BLD AUTO: 4.32 THOUSANDS/ΜL (ref 1.85–7.62)
NEUTS SEG NFR BLD AUTO: 62 % (ref 43–75)
NRBC BLD AUTO-RTO: 0 /100 WBCS
PLATELET # BLD AUTO: 187 THOUSANDS/UL (ref 149–390)
PMV BLD AUTO: 11.4 FL (ref 8.9–12.7)
POTASSIUM SERPL-SCNC: 3.9 MMOL/L (ref 3.5–5.3)
RBC # BLD AUTO: 5.06 MILLION/UL (ref 3.88–5.62)
SODIUM SERPL-SCNC: 139 MMOL/L (ref 136–145)
WBC # BLD AUTO: 7.1 THOUSAND/UL (ref 4.31–10.16)

## 2019-03-29 PROCEDURE — 82550 ASSAY OF CK (CPK): CPT | Performed by: PHYSICIAN ASSISTANT

## 2019-03-29 PROCEDURE — 97535 SELF CARE MNGMENT TRAINING: CPT

## 2019-03-29 PROCEDURE — G0515 COGNITIVE SKILLS DEVELOPMENT: HCPCS

## 2019-03-29 PROCEDURE — 82553 CREATINE MB FRACTION: CPT | Performed by: PHYSICIAN ASSISTANT

## 2019-03-29 PROCEDURE — 99232 SBSQ HOSP IP/OBS MODERATE 35: CPT | Performed by: EMERGENCY MEDICINE

## 2019-03-29 PROCEDURE — 85025 COMPLETE CBC W/AUTO DIFF WBC: CPT | Performed by: PHYSICIAN ASSISTANT

## 2019-03-29 PROCEDURE — 80048 BASIC METABOLIC PNL TOTAL CA: CPT | Performed by: PHYSICIAN ASSISTANT

## 2019-03-29 PROCEDURE — 97530 THERAPEUTIC ACTIVITIES: CPT

## 2019-03-29 PROCEDURE — 99232 SBSQ HOSP IP/OBS MODERATE 35: CPT | Performed by: INTERNAL MEDICINE

## 2019-03-29 RX ADMIN — PHENYTOIN SODIUM 400 MG: 100 CAPSULE ORAL at 21:12

## 2019-03-29 RX ADMIN — METHOCARBAMOL 750 MG: 750 TABLET, FILM COATED ORAL at 17:31

## 2019-03-29 RX ADMIN — SODIUM CHLORIDE, SODIUM GLUCONATE, SODIUM ACETATE, POTASSIUM CHLORIDE, MAGNESIUM CHLORIDE, SODIUM PHOSPHATE, DIBASIC, AND POTASSIUM PHOSPHATE 150 ML/HR: .53; .5; .37; .037; .03; .012; .00082 INJECTION, SOLUTION INTRAVENOUS at 19:51

## 2019-03-29 RX ADMIN — METHOCARBAMOL 750 MG: 750 TABLET, FILM COATED ORAL at 11:44

## 2019-03-29 RX ADMIN — DOCUSATE SODIUM 100 MG: 100 CAPSULE, LIQUID FILLED ORAL at 11:44

## 2019-03-29 RX ADMIN — PHENYTOIN SODIUM 400 MG: 100 CAPSULE ORAL at 08:18

## 2019-03-29 RX ADMIN — METHOCARBAMOL 750 MG: 750 TABLET, FILM COATED ORAL at 05:33

## 2019-03-29 RX ADMIN — SODIUM CHLORIDE, SODIUM GLUCONATE, SODIUM ACETATE, POTASSIUM CHLORIDE, MAGNESIUM CHLORIDE, SODIUM PHOSPHATE, DIBASIC, AND POTASSIUM PHOSPHATE 150 ML/HR: .53; .5; .37; .037; .03; .012; .00082 INJECTION, SOLUTION INTRAVENOUS at 06:51

## 2019-03-29 RX ADMIN — HEPARIN SODIUM 5000 UNITS: 5000 INJECTION INTRAVENOUS; SUBCUTANEOUS at 21:13

## 2019-03-29 RX ADMIN — LIDOCAINE 1 PATCH: 50 PATCH CUTANEOUS at 08:18

## 2019-03-29 RX ADMIN — ACETAMINOPHEN 975 MG: 325 TABLET ORAL at 05:33

## 2019-03-29 RX ADMIN — HEPARIN SODIUM 5000 UNITS: 5000 INJECTION INTRAVENOUS; SUBCUTANEOUS at 05:34

## 2019-03-29 RX ADMIN — SODIUM CHLORIDE, SODIUM GLUCONATE, SODIUM ACETATE, POTASSIUM CHLORIDE, MAGNESIUM CHLORIDE, SODIUM PHOSPHATE, DIBASIC, AND POTASSIUM PHOSPHATE 150 ML/HR: .53; .5; .37; .037; .03; .012; .00082 INJECTION, SOLUTION INTRAVENOUS at 12:59

## 2019-03-29 RX ADMIN — LIDOCAINE 1 PATCH: 50 PATCH CUTANEOUS at 13:33

## 2019-03-29 RX ADMIN — DOCUSATE SODIUM 100 MG: 100 CAPSULE, LIQUID FILLED ORAL at 17:31

## 2019-03-29 RX ADMIN — HEPARIN SODIUM 5000 UNITS: 5000 INJECTION INTRAVENOUS; SUBCUTANEOUS at 13:21

## 2019-03-30 LAB
ANION GAP SERPL CALCULATED.3IONS-SCNC: 6 MMOL/L (ref 4–13)
BASOPHILS # BLD AUTO: 0.03 THOUSANDS/ΜL (ref 0–0.1)
BASOPHILS NFR BLD AUTO: 1 % (ref 0–1)
BUN SERPL-MCNC: 8 MG/DL (ref 5–25)
CALCIUM SERPL-MCNC: 8.7 MG/DL (ref 8.3–10.1)
CHLORIDE SERPL-SCNC: 107 MMOL/L (ref 100–108)
CK MB SERPL-MCNC: 3.2 NG/ML (ref 0–5)
CK MB SERPL-MCNC: <1 % (ref 0–2.5)
CK SERPL-CCNC: ABNORMAL U/L (ref 39–308)
CO2 SERPL-SCNC: 25 MMOL/L (ref 21–32)
CREAT SERPL-MCNC: 1.54 MG/DL (ref 0.6–1.3)
EOSINOPHIL # BLD AUTO: 0.36 THOUSAND/ΜL (ref 0–0.61)
EOSINOPHIL NFR BLD AUTO: 6 % (ref 0–6)
ERYTHROCYTE [DISTWIDTH] IN BLOOD BY AUTOMATED COUNT: 12.5 % (ref 11.6–15.1)
GFR SERPL CREATININE-BSD FRML MDRD: 54 ML/MIN/1.73SQ M
GLUCOSE SERPL-MCNC: 94 MG/DL (ref 65–140)
HCT VFR BLD AUTO: 45.8 % (ref 36.5–49.3)
HGB BLD-MCNC: 14.9 G/DL (ref 12–17)
IMM GRANULOCYTES # BLD AUTO: 0.01 THOUSAND/UL (ref 0–0.2)
IMM GRANULOCYTES NFR BLD AUTO: 0 % (ref 0–2)
LYMPHOCYTES # BLD AUTO: 1.69 THOUSANDS/ΜL (ref 0.6–4.47)
LYMPHOCYTES NFR BLD AUTO: 27 % (ref 14–44)
MCH RBC QN AUTO: 27.9 PG (ref 26.8–34.3)
MCHC RBC AUTO-ENTMCNC: 32.5 G/DL (ref 31.4–37.4)
MCV RBC AUTO: 86 FL (ref 82–98)
MONOCYTES # BLD AUTO: 0.54 THOUSAND/ΜL (ref 0.17–1.22)
MONOCYTES NFR BLD AUTO: 9 % (ref 4–12)
NEUTROPHILS # BLD AUTO: 3.53 THOUSANDS/ΜL (ref 1.85–7.62)
NEUTS SEG NFR BLD AUTO: 57 % (ref 43–75)
NRBC BLD AUTO-RTO: 0 /100 WBCS
PLATELET # BLD AUTO: 210 THOUSANDS/UL (ref 149–390)
PMV BLD AUTO: 11.5 FL (ref 8.9–12.7)
POTASSIUM SERPL-SCNC: 3.6 MMOL/L (ref 3.5–5.3)
RBC # BLD AUTO: 5.35 MILLION/UL (ref 3.88–5.62)
SODIUM SERPL-SCNC: 138 MMOL/L (ref 136–145)
WBC # BLD AUTO: 6.16 THOUSAND/UL (ref 4.31–10.16)

## 2019-03-30 PROCEDURE — 82550 ASSAY OF CK (CPK): CPT | Performed by: PHYSICIAN ASSISTANT

## 2019-03-30 PROCEDURE — 85025 COMPLETE CBC W/AUTO DIFF WBC: CPT | Performed by: PHYSICIAN ASSISTANT

## 2019-03-30 PROCEDURE — 97535 SELF CARE MNGMENT TRAINING: CPT

## 2019-03-30 PROCEDURE — 82553 CREATINE MB FRACTION: CPT | Performed by: PHYSICIAN ASSISTANT

## 2019-03-30 PROCEDURE — 80048 BASIC METABOLIC PNL TOTAL CA: CPT | Performed by: PHYSICIAN ASSISTANT

## 2019-03-30 PROCEDURE — 99232 SBSQ HOSP IP/OBS MODERATE 35: CPT | Performed by: INTERNAL MEDICINE

## 2019-03-30 PROCEDURE — 99232 SBSQ HOSP IP/OBS MODERATE 35: CPT | Performed by: SURGERY

## 2019-03-30 RX ADMIN — ACETAMINOPHEN 975 MG: 325 TABLET ORAL at 21:25

## 2019-03-30 RX ADMIN — METHOCARBAMOL 750 MG: 750 TABLET, FILM COATED ORAL at 12:22

## 2019-03-30 RX ADMIN — METHOCARBAMOL 750 MG: 750 TABLET, FILM COATED ORAL at 17:10

## 2019-03-30 RX ADMIN — METHOCARBAMOL 750 MG: 750 TABLET, FILM COATED ORAL at 07:47

## 2019-03-30 RX ADMIN — DOCUSATE SODIUM 100 MG: 100 CAPSULE, LIQUID FILLED ORAL at 09:16

## 2019-03-30 RX ADMIN — PHENYTOIN SODIUM 400 MG: 100 CAPSULE ORAL at 21:25

## 2019-03-30 RX ADMIN — PHENYTOIN SODIUM 400 MG: 100 CAPSULE ORAL at 09:16

## 2019-03-30 RX ADMIN — HEPARIN SODIUM 5000 UNITS: 5000 INJECTION INTRAVENOUS; SUBCUTANEOUS at 07:47

## 2019-03-30 RX ADMIN — HEPARIN SODIUM 5000 UNITS: 5000 INJECTION INTRAVENOUS; SUBCUTANEOUS at 21:25

## 2019-03-30 RX ADMIN — SODIUM CHLORIDE, SODIUM GLUCONATE, SODIUM ACETATE, POTASSIUM CHLORIDE, MAGNESIUM CHLORIDE, SODIUM PHOSPHATE, DIBASIC, AND POTASSIUM PHOSPHATE 150 ML/HR: .53; .5; .37; .037; .03; .012; .00082 INJECTION, SOLUTION INTRAVENOUS at 21:25

## 2019-03-30 RX ADMIN — ACETAMINOPHEN 975 MG: 325 TABLET ORAL at 07:47

## 2019-03-30 RX ADMIN — LIDOCAINE 1 PATCH: 50 PATCH CUTANEOUS at 09:16

## 2019-03-30 RX ADMIN — METHOCARBAMOL 750 MG: 750 TABLET, FILM COATED ORAL at 01:45

## 2019-03-30 RX ADMIN — SODIUM CHLORIDE, SODIUM GLUCONATE, SODIUM ACETATE, POTASSIUM CHLORIDE, MAGNESIUM CHLORIDE, SODIUM PHOSPHATE, DIBASIC, AND POTASSIUM PHOSPHATE 150 ML/HR: .53; .5; .37; .037; .03; .012; .00082 INJECTION, SOLUTION INTRAVENOUS at 07:46

## 2019-03-30 RX ADMIN — SENNOSIDES 17.2 MG: 8.6 TABLET, FILM COATED ORAL at 21:25

## 2019-03-30 RX ADMIN — SODIUM CHLORIDE, SODIUM GLUCONATE, SODIUM ACETATE, POTASSIUM CHLORIDE, MAGNESIUM CHLORIDE, SODIUM PHOSPHATE, DIBASIC, AND POTASSIUM PHOSPHATE 150 ML/HR: .53; .5; .37; .037; .03; .012; .00082 INJECTION, SOLUTION INTRAVENOUS at 14:22

## 2019-03-31 LAB
ANION GAP SERPL CALCULATED.3IONS-SCNC: 2 MMOL/L (ref 4–13)
BASOPHILS # BLD AUTO: 0.05 THOUSANDS/ΜL (ref 0–0.1)
BASOPHILS NFR BLD AUTO: 1 % (ref 0–1)
BUN SERPL-MCNC: 9 MG/DL (ref 5–25)
CALCIUM SERPL-MCNC: 8.8 MG/DL (ref 8.3–10.1)
CHLORIDE SERPL-SCNC: 109 MMOL/L (ref 100–108)
CK MB SERPL-MCNC: 2.3 NG/ML (ref 0–5)
CK MB SERPL-MCNC: <1 % (ref 0–2.5)
CK SERPL-CCNC: ABNORMAL U/L (ref 39–308)
CO2 SERPL-SCNC: 28 MMOL/L (ref 21–32)
CREAT SERPL-MCNC: 1.52 MG/DL (ref 0.6–1.3)
EOSINOPHIL # BLD AUTO: 0.45 THOUSAND/ΜL (ref 0–0.61)
EOSINOPHIL NFR BLD AUTO: 9 % (ref 0–6)
ERYTHROCYTE [DISTWIDTH] IN BLOOD BY AUTOMATED COUNT: 12.7 % (ref 11.6–15.1)
GFR SERPL CREATININE-BSD FRML MDRD: 55 ML/MIN/1.73SQ M
GLUCOSE SERPL-MCNC: 91 MG/DL (ref 65–140)
HCT VFR BLD AUTO: 46.5 % (ref 36.5–49.3)
HGB BLD-MCNC: 15.2 G/DL (ref 12–17)
IMM GRANULOCYTES # BLD AUTO: 0.01 THOUSAND/UL (ref 0–0.2)
IMM GRANULOCYTES NFR BLD AUTO: 0 % (ref 0–2)
LYMPHOCYTES # BLD AUTO: 1.91 THOUSANDS/ΜL (ref 0.6–4.47)
LYMPHOCYTES NFR BLD AUTO: 37 % (ref 14–44)
MCH RBC QN AUTO: 28.1 PG (ref 26.8–34.3)
MCHC RBC AUTO-ENTMCNC: 32.7 G/DL (ref 31.4–37.4)
MCV RBC AUTO: 86 FL (ref 82–98)
MONOCYTES # BLD AUTO: 0.52 THOUSAND/ΜL (ref 0.17–1.22)
MONOCYTES NFR BLD AUTO: 10 % (ref 4–12)
NEUTROPHILS # BLD AUTO: 2.26 THOUSANDS/ΜL (ref 1.85–7.62)
NEUTS SEG NFR BLD AUTO: 43 % (ref 43–75)
NRBC BLD AUTO-RTO: 0 /100 WBCS
PLATELET # BLD AUTO: 219 THOUSANDS/UL (ref 149–390)
PMV BLD AUTO: 10.9 FL (ref 8.9–12.7)
POTASSIUM SERPL-SCNC: 4.4 MMOL/L (ref 3.5–5.3)
RBC # BLD AUTO: 5.4 MILLION/UL (ref 3.88–5.62)
SODIUM SERPL-SCNC: 139 MMOL/L (ref 136–145)
WBC # BLD AUTO: 5.2 THOUSAND/UL (ref 4.31–10.16)

## 2019-03-31 PROCEDURE — 99232 SBSQ HOSP IP/OBS MODERATE 35: CPT | Performed by: NURSE PRACTITIONER

## 2019-03-31 PROCEDURE — 82550 ASSAY OF CK (CPK): CPT | Performed by: NURSE PRACTITIONER

## 2019-03-31 PROCEDURE — 82553 CREATINE MB FRACTION: CPT | Performed by: NURSE PRACTITIONER

## 2019-03-31 PROCEDURE — 85025 COMPLETE CBC W/AUTO DIFF WBC: CPT | Performed by: NURSE PRACTITIONER

## 2019-03-31 PROCEDURE — 99232 SBSQ HOSP IP/OBS MODERATE 35: CPT | Performed by: INTERNAL MEDICINE

## 2019-03-31 PROCEDURE — 80048 BASIC METABOLIC PNL TOTAL CA: CPT | Performed by: NURSE PRACTITIONER

## 2019-03-31 RX ORDER — CLONAZEPAM 0.5 MG/1
0.5 TABLET ORAL
COMMUNITY
End: 2019-08-15 | Stop reason: SDUPTHER

## 2019-03-31 RX ORDER — QUETIAPINE FUMARATE 100 MG/1
100 TABLET, FILM COATED ORAL
COMMUNITY
End: 2019-08-15 | Stop reason: SDUPTHER

## 2019-03-31 RX ORDER — QUETIAPINE FUMARATE 25 MG/1
50 TABLET, FILM COATED ORAL
Status: DISCONTINUED | OUTPATIENT
Start: 2019-03-31 | End: 2019-04-01 | Stop reason: HOSPADM

## 2019-03-31 RX ADMIN — METHOCARBAMOL 750 MG: 750 TABLET, FILM COATED ORAL at 05:31

## 2019-03-31 RX ADMIN — HEPARIN SODIUM 5000 UNITS: 5000 INJECTION INTRAVENOUS; SUBCUTANEOUS at 21:01

## 2019-03-31 RX ADMIN — PHENYTOIN SODIUM 400 MG: 100 CAPSULE ORAL at 21:02

## 2019-03-31 RX ADMIN — PHENYTOIN SODIUM 400 MG: 100 CAPSULE ORAL at 08:24

## 2019-03-31 RX ADMIN — LIDOCAINE 1 PATCH: 50 PATCH CUTANEOUS at 08:24

## 2019-03-31 RX ADMIN — METHOCARBAMOL 750 MG: 750 TABLET, FILM COATED ORAL at 12:21

## 2019-03-31 RX ADMIN — QUETIAPINE FUMARATE 50 MG: 25 TABLET ORAL at 21:01

## 2019-03-31 RX ADMIN — SODIUM CHLORIDE, SODIUM GLUCONATE, SODIUM ACETATE, POTASSIUM CHLORIDE, MAGNESIUM CHLORIDE, SODIUM PHOSPHATE, DIBASIC, AND POTASSIUM PHOSPHATE 150 ML/HR: .53; .5; .37; .037; .03; .012; .00082 INJECTION, SOLUTION INTRAVENOUS at 18:44

## 2019-03-31 RX ADMIN — SODIUM CHLORIDE, SODIUM GLUCONATE, SODIUM ACETATE, POTASSIUM CHLORIDE, MAGNESIUM CHLORIDE, SODIUM PHOSPHATE, DIBASIC, AND POTASSIUM PHOSPHATE 150 ML/HR: .53; .5; .37; .037; .03; .012; .00082 INJECTION, SOLUTION INTRAVENOUS at 11:13

## 2019-03-31 RX ADMIN — HEPARIN SODIUM 5000 UNITS: 5000 INJECTION INTRAVENOUS; SUBCUTANEOUS at 05:31

## 2019-04-01 VITALS
BODY MASS INDEX: 25.22 KG/M2 | OXYGEN SATURATION: 97 % | SYSTOLIC BLOOD PRESSURE: 109 MMHG | WEIGHT: 160.72 LBS | TEMPERATURE: 97.9 F | HEART RATE: 62 BPM | RESPIRATION RATE: 16 BRPM | HEIGHT: 67 IN | DIASTOLIC BLOOD PRESSURE: 74 MMHG

## 2019-04-01 LAB
ANION GAP SERPL CALCULATED.3IONS-SCNC: 7 MMOL/L (ref 4–13)
BUN SERPL-MCNC: 9 MG/DL (ref 5–25)
CALCIUM SERPL-MCNC: 8.6 MG/DL (ref 8.3–10.1)
CHLORIDE SERPL-SCNC: 110 MMOL/L (ref 100–108)
CK MB SERPL-MCNC: 1.4 NG/ML (ref 0–5)
CK MB SERPL-MCNC: <1 % (ref 0–2.5)
CK SERPL-CCNC: 5720 U/L (ref 39–308)
CO2 SERPL-SCNC: 25 MMOL/L (ref 21–32)
CREAT SERPL-MCNC: 1.3 MG/DL (ref 0.6–1.3)
GFR SERPL CREATININE-BSD FRML MDRD: 67 ML/MIN/1.73SQ M
GLUCOSE SERPL-MCNC: 84 MG/DL (ref 65–140)
POTASSIUM SERPL-SCNC: 3.8 MMOL/L (ref 3.5–5.3)
SODIUM SERPL-SCNC: 142 MMOL/L (ref 136–145)

## 2019-04-01 PROCEDURE — 82553 CREATINE MB FRACTION: CPT | Performed by: NURSE PRACTITIONER

## 2019-04-01 PROCEDURE — 80048 BASIC METABOLIC PNL TOTAL CA: CPT | Performed by: NURSE PRACTITIONER

## 2019-04-01 PROCEDURE — 99238 HOSP IP/OBS DSCHRG MGMT 30/<: CPT | Performed by: PHYSICIAN ASSISTANT

## 2019-04-01 PROCEDURE — 82550 ASSAY OF CK (CPK): CPT | Performed by: NURSE PRACTITIONER

## 2019-04-01 RX ORDER — PHENYTOIN SODIUM 200 MG/1
400 CAPSULE, EXTENDED RELEASE ORAL EVERY 12 HOURS SCHEDULED
Qty: 120 CAPSULE | Refills: 0 | Status: SHIPPED | OUTPATIENT
Start: 2019-04-01 | End: 2019-08-15 | Stop reason: SDUPTHER

## 2019-04-01 RX ORDER — ACETAMINOPHEN 325 MG/1
975 TABLET ORAL EVERY 8 HOURS SCHEDULED
Qty: 30 TABLET | Refills: 0
Start: 2019-04-01

## 2019-04-01 RX ADMIN — SODIUM CHLORIDE, SODIUM GLUCONATE, SODIUM ACETATE, POTASSIUM CHLORIDE, MAGNESIUM CHLORIDE, SODIUM PHOSPHATE, DIBASIC, AND POTASSIUM PHOSPHATE 150 ML/HR: .53; .5; .37; .037; .03; .012; .00082 INJECTION, SOLUTION INTRAVENOUS at 00:51

## 2019-04-01 RX ADMIN — PHENYTOIN SODIUM 400 MG: 100 CAPSULE ORAL at 08:55

## 2019-04-01 RX ADMIN — SODIUM CHLORIDE, SODIUM GLUCONATE, SODIUM ACETATE, POTASSIUM CHLORIDE, MAGNESIUM CHLORIDE, SODIUM PHOSPHATE, DIBASIC, AND POTASSIUM PHOSPHATE 150 ML/HR: .53; .5; .37; .037; .03; .012; .00082 INJECTION, SOLUTION INTRAVENOUS at 07:35

## 2019-04-01 RX ADMIN — LIDOCAINE 1 PATCH: 50 PATCH CUTANEOUS at 08:55

## 2019-04-02 ENCOUNTER — TELEPHONE (OUTPATIENT)
Dept: NEUROLOGY | Facility: CLINIC | Age: 44
End: 2019-04-02

## 2019-04-17 ENCOUNTER — OFFICE VISIT (OUTPATIENT)
Dept: FAMILY MEDICINE CLINIC | Facility: CLINIC | Age: 44
End: 2019-04-17

## 2019-04-17 VITALS
HEART RATE: 85 BPM | BODY MASS INDEX: 23.07 KG/M2 | SYSTOLIC BLOOD PRESSURE: 128 MMHG | TEMPERATURE: 97.5 F | HEIGHT: 67 IN | OXYGEN SATURATION: 99 % | RESPIRATION RATE: 18 BRPM | WEIGHT: 147 LBS | DIASTOLIC BLOOD PRESSURE: 80 MMHG

## 2019-04-17 DIAGNOSIS — M79.10 MYALGIA: Primary | ICD-10-CM

## 2019-04-17 DIAGNOSIS — R56.9 SEIZURE (HCC): ICD-10-CM

## 2019-04-17 PROBLEM — M75.122 COMPLETE TEAR OF LEFT ROTATOR CUFF: Status: ACTIVE | Noted: 2019-01-16

## 2019-04-17 PROBLEM — D72.829 LEUKOCYTOSIS: Status: RESOLVED | Noted: 2018-09-14 | Resolved: 2019-04-17

## 2019-04-17 PROCEDURE — 99213 OFFICE O/P EST LOW 20 MIN: CPT | Performed by: FAMILY MEDICINE

## 2019-04-17 PROCEDURE — 1111F DSCHRG MED/CURRENT MED MERGE: CPT | Performed by: FAMILY MEDICINE

## 2019-04-17 RX ORDER — METHOCARBAMOL 500 MG/1
500 TABLET, FILM COATED ORAL 4 TIMES DAILY PRN
COMMUNITY
Start: 2019-01-30 | End: 2022-04-25

## 2019-08-15 ENCOUNTER — OFFICE VISIT (OUTPATIENT)
Dept: FAMILY MEDICINE CLINIC | Facility: CLINIC | Age: 44
End: 2019-08-15

## 2019-08-15 VITALS
BODY MASS INDEX: 24.39 KG/M2 | SYSTOLIC BLOOD PRESSURE: 124 MMHG | RESPIRATION RATE: 18 BRPM | OXYGEN SATURATION: 97 % | DIASTOLIC BLOOD PRESSURE: 80 MMHG | HEIGHT: 67 IN | WEIGHT: 155.4 LBS | HEART RATE: 103 BPM | TEMPERATURE: 97.8 F

## 2019-08-15 DIAGNOSIS — R56.9 SEIZURE (HCC): Primary | ICD-10-CM

## 2019-08-15 DIAGNOSIS — N18.9 CHRONIC KIDNEY DISEASE, UNSPECIFIED CKD STAGE: ICD-10-CM

## 2019-08-15 PROCEDURE — 99213 OFFICE O/P EST LOW 20 MIN: CPT | Performed by: FAMILY MEDICINE

## 2019-08-15 PROCEDURE — 3008F BODY MASS INDEX DOCD: CPT | Performed by: FAMILY MEDICINE

## 2019-08-15 RX ORDER — PHENYTOIN SODIUM 300 MG/1
300 CAPSULE, EXTENDED RELEASE ORAL 2 TIMES DAILY
COMMUNITY

## 2019-08-15 NOTE — PROGRESS NOTES
Assessment/Plan:    Seizure ([facility] [address] )  - CT 3/25 with SAH and resolution 3/27  - Per patient on dilantin 300 mg BID at this visit and states neuro is aware  No interval seizures at this dose     CKD (chronic kidney disease)  - Per hospital notes 4/2019  - On EMR review creat fluctuant from 0 95 to 3 51    - Not diabetic  Normotensive   - Stay well hydrated   - Avoid nephrotoxic medications  - To get CMP done        Diagnoses and all orders for this visit:    Seizure ([facility] [address] )    Chronic kidney disease, unspecified CKD stage    Other orders  -     phenytoin extended (DILANTIN) 300 MG ER capsule; Take 300 mg by mouth 2 (two) times a day          Subjective:      Patient ID: Emma Reddy is a 38 y o  male  38 y o male presents for f/u of seizures  Last episode in 3/2019  Has been seizure free since  Compliant with phenytoin  States current dose is 300 mg BID  Phenytoin free and total wnl on 4/22/2019  Follows with Dr Sam Cotton at [facility]  Patient follows with Lexx Mcneill   Psychologist Ronda q weekly  Diagnosed with bipolar disorder  Denies any SI/ HI at this visit  CMP ordered at past visit pending  Was ordered to check renal status as patient's creat and eGFR on file have been fluctuant and largely from hospitalizations for acute complaints  Concern with underlying CKD  Patient states he will get lab drawn  The following portions of the patient's history were reviewed and updated as appropriate: He  has a past medical history of Bipolar 1 disorder ([facility] [address] ), Myalgia (4/17/2019), Schizophrenia ([facility] [address] ), Seizures ([facility] [address] ), Subdural hematoma ([facility] [address] ) (3/26/2019), and Traumatic brain injury ([facility] [address] ) (3/27/2019)    Patient Active Problem List    Diagnosis Date Noted    Elevated CK 03/28/2019    Bipolar disorder ([facility] [address] ) 03/27/2019    Acute kidney injury ([facility] [address] ) 03/27/2019    SAH (subarachnoid hemorrhage) ([facility] [address] ) 03/26/2019    CKD (chronic kidney disease) 03/26/2019    Complete tear of left rotator cuff 01/16/2019  Seizure ([place] [id number] ) 09/14/2018    Drug abuse ([place] [address] ) 09/14/2018    Left rotator cuff tear 09/12/2018    Acute pain of left shoulder 03/13/2018    Adhesive capsulitis of left shoulder 03/13/2018    Anxiety 01/31/2018    Bipolar disord, crnt epsd depress, severe, w psych features ([place] [address] ) 01/31/2018    Gastroesophageal reflux disease 01/31/2018    Polysubstance abuse ([place] [address] ) 08/01/2017     He  has a past surgical history that includes Rotator cuff repair  His family history is not on file  He  reports that he has been smoking  He has been smoking about 0 25 packs per day  He uses smokeless tobacco  He reports that he drank alcohol  He reports that he has current or past drug history  Drug: Marijuana  Current Outpatient Medications   Medication Sig Dispense Refill    acetaminophen (TYLENOL) 325 mg tablet Take 3 tablets (975 mg total) by mouth every 8 (eight) hours 30 tablet 0    clonazePAM (KlonoPIN) 0 5 mg tablet Take 0 5 mg by mouth daily at bedtime  0    escitalopram (LEXAPRO) 20 mg tablet every 24 hours      phenytoin extended (DILANTIN) 300 MG ER capsule Take 300 mg by mouth 2 (two) times a day      prazosin (MINIPRESS) 2 mg capsule Take 2 mg by mouth 2 (two) times a day  0    QUEtiapine (SEROquel) 100 mg tablet Take 100 mg by mouth daily at bedtime  0    methocarbamol (ROBAXIN) 500 mg tablet Take 500 mg by mouth 4 (four) times a day as needed       No current facility-administered medications for this visit        Current Outpatient Medications on File Prior to Visit   Medication Sig    acetaminophen (TYLENOL) 325 mg tablet Take 3 tablets (975 mg total) by mouth every 8 (eight) hours    clonazePAM (KlonoPIN) 0 5 mg tablet Take 0 5 mg by mouth daily at bedtime    escitalopram (LEXAPRO) 20 mg tablet every 24 hours    phenytoin extended (DILANTIN) 300 MG ER capsule Take 300 mg by mouth 2 (two) times a day    prazosin (MINIPRESS) 2 mg capsule Take 2 mg by mouth 2 (two) times a day    QUEtiapine (SEROquel) 100 mg tablet Take 100 mg by mouth daily at bedtime    methocarbamol (ROBAXIN) 500 mg tablet Take 500 mg by mouth 4 (four) times a day as needed     No current facility-administered medications on file prior to visit  He is allergic to no active allergies       Review of Systems   Constitutional: Negative for chills and fever  HENT: Negative for congestion, sore throat and trouble swallowing  Respiratory: Negative for cough, shortness of breath and wheezing  Cardiovascular: Negative for chest pain, palpitations and leg swelling  Gastrointestinal: Negative for abdominal pain, blood in stool, constipation, diarrhea, nausea and vomiting  Genitourinary: Negative for dysuria and hematuria  Musculoskeletal: Positive for arthralgias  Negative for back pain and myalgias  Skin: Negative for rash  Neurological: Negative for seizures, weakness and headaches  Hematological: Negative for adenopathy  Psychiatric/Behavioral: Negative for self-injury and suicidal ideas  Objective:      /80 (BP Location: Right arm, Patient Position: Sitting, Cuff Size: Standard)   Pulse 103   Temp 97 8 °F (36 6 °C) (Temporal)   Resp 18   Ht 5' 7" (1 702 m)   Wt 70 5 kg (155 lb 6 4 oz)   SpO2 97%   BMI 24 34 kg/m²          Physical Exam   Constitutional: He is oriented to person, place, and time  He appears well-developed and well-nourished  No distress  HENT:   Head: Normocephalic and atraumatic  Right Ear: External ear normal    Left Ear: External ear normal    Mouth/Throat: No oropharyngeal exudate  Eyes: Pupils are equal, round, and reactive to light  Conjunctivae and EOM are normal  Right eye exhibits no discharge  Left eye exhibits no discharge  Neck: Normal range of motion  Cardiovascular: Normal rate, regular rhythm and normal heart sounds  Exam reveals no gallop and no friction rub  No murmur heard    Pulmonary/Chest: Effort normal and breath sounds normal  No respiratory distress  He has no wheezes  He has no rales  Abdominal: Soft  Bowel sounds are normal  He exhibits no distension  There is no tenderness  There is no rebound and no guarding  Musculoskeletal: He exhibits no edema  Lymphadenopathy:     He has no cervical adenopathy  Neurological: He is alert and oriented to person, place, and time  He exhibits normal muscle tone  Skin: Skin is warm  No rash noted  He is not diaphoretic  Vitals reviewed

## 2019-08-16 PROBLEM — M79.10 MYALGIA: Status: RESOLVED | Noted: 2019-04-17 | Resolved: 2019-08-16

## 2019-08-16 PROBLEM — S06.9XAA TRAUMATIC BRAIN INJURY: Status: RESOLVED | Noted: 2019-03-27 | Resolved: 2019-08-16

## 2019-08-16 PROBLEM — S06.5XAA SUBDURAL HEMATOMA: Status: RESOLVED | Noted: 2019-03-26 | Resolved: 2019-08-16

## 2019-08-16 PROBLEM — S06.5X9A SUBDURAL HEMATOMA (HCC): Status: RESOLVED | Noted: 2019-03-26 | Resolved: 2019-08-16

## 2019-08-16 PROBLEM — S06.9X9A TRAUMATIC BRAIN INJURY (HCC): Status: RESOLVED | Noted: 2019-03-27 | Resolved: 2019-08-16

## 2019-08-16 NOTE — ASSESSMENT & PLAN NOTE
- Per hospital notes 4/2019  - On EMR review creat fluctuant from 0 95 to 3 51    - Not diabetic   Normotensive   - Stay well hydrated   - Avoid nephrotoxic medications  - To get CMP done

## 2019-08-16 NOTE — ASSESSMENT & PLAN NOTE
- CT 3/25 with SAH and resolution 3/27  - Per patient on dilantin 300 mg BID at this visit and states neuro is aware   No interval seizures at this dose

## 2019-12-16 ENCOUNTER — OFFICE VISIT (OUTPATIENT)
Dept: FAMILY MEDICINE CLINIC | Facility: CLINIC | Age: 44
End: 2019-12-16

## 2019-12-16 VITALS
SYSTOLIC BLOOD PRESSURE: 130 MMHG | RESPIRATION RATE: 18 BRPM | BODY MASS INDEX: 25.58 KG/M2 | TEMPERATURE: 96.9 F | HEIGHT: 67 IN | HEART RATE: 86 BPM | WEIGHT: 163 LBS | DIASTOLIC BLOOD PRESSURE: 80 MMHG | OXYGEN SATURATION: 97 %

## 2019-12-16 DIAGNOSIS — R56.9 SEIZURE (HCC): Primary | ICD-10-CM

## 2019-12-16 PROBLEM — N17.9 ACUTE KIDNEY INJURY (HCC): Status: RESOLVED | Noted: 2019-03-27 | Resolved: 2019-12-16

## 2019-12-16 PROCEDURE — 99213 OFFICE O/P EST LOW 20 MIN: CPT | Performed by: FAMILY MEDICINE

## 2019-12-16 PROCEDURE — 3008F BODY MASS INDEX DOCD: CPT | Performed by: FAMILY MEDICINE

## 2019-12-16 RX ORDER — MULTIVITAMIN WITH FOLIC ACID 400 MCG
1 TABLET ORAL EVERY MORNING
Refills: 1 | COMMUNITY
Start: 2019-11-30

## 2019-12-16 NOTE — PROGRESS NOTES
Assessment/Plan:    Seizure [facility])  - Last seizure in March 2019  Currently on Dilantin 300 mg BID  - Has lab orders to check phenytoin level  - On follow up with neurology at [facility] ordered at past visit pending  - Counseled on good dental hygiene and importance of regular dental check up while on phenytoin        There are no diagnoses linked to this encounter  Subjective:      Patient ID: Dipak Lovett is a 38 y o  male  38 y o male presents for f/u of seizures  Currently on phenytoin  Following with neurology  Levels 4/2019 therapeutic  Has lab order for phenytoin level check  Seizure free since March 2019  No new complaints at this visit  The following portions of the patient's history were reviewed and updated as appropriate: He  has a past medical history of Bipolar 1 disorder ([facility] [address] ), Myalgia (4/17/2019), Schizophrenia ([facility] [address] ), Seizures ([facility] [address] ), Subdural hematoma ([facility] [address] ) (3/26/2019), and Traumatic brain injury ([facility] [address] ) (3/27/2019)  Patient Active Problem List    Diagnosis Date Noted    Elevated CK 03/28/2019    Bipolar disorder ([facility] [address] ) 03/27/2019    SAH (subarachnoid hemorrhage) ([facility] [address] ) 03/26/2019    CKD (chronic kidney disease) 03/26/2019    Complete tear of left rotator cuff 01/16/2019    Drug abuse ([facility] [address] ) 09/14/2018    Left rotator cuff tear 09/12/2018    Acute pain of left shoulder 03/13/2018    Adhesive capsulitis of left shoulder 03/13/2018    Anxiety 01/31/2018    Bipolar disord, crnt epsd depress, severe, w psych features ([address] ) 01/31/2018    Gastroesophageal reflux disease 01/31/2018    Seizure ([facility] [address] ) 08/01/2017    Polysubstance abuse ([facility] [address] ) 08/01/2017     He  has a past surgical history that includes Rotator cuff repair  His family history is not on file  He  reports that he has been smoking  He has been smoking about 0 25 packs per day  He uses smokeless tobacco  He reports that he drank alcohol  He reports that he has current or past drug history  Drug: Marijuana    Current Outpatient Medications   Medication Sig Dispense Refill    acetaminophen (TYLENOL) 325 mg tablet Take 3 tablets (975 mg total) by mouth every 8 (eight) hours 30 tablet 0    clonazePAM (KlonoPIN) 0 5 mg tablet Take 0 5 mg by mouth daily at bedtime  0    escitalopram (LEXAPRO) 20 mg tablet every 24 hours      methocarbamol (ROBAXIN) 500 mg tablet Take 500 mg by mouth 4 (four) times a day as needed      Multiple Vitamin (DAILY-VICKY) TABS Take 1 tablet by mouth every morning  1    phenytoin extended (DILANTIN) 300 MG ER capsule Take 300 mg by mouth 2 (two) times a day      prazosin (MINIPRESS) 2 mg capsule Take 2 mg by mouth 2 (two) times a day  0    QUEtiapine (SEROquel) 100 mg tablet Take 100 mg by mouth daily at bedtime  0     No current facility-administered medications for this visit  Current Outpatient Medications on File Prior to Visit   Medication Sig    acetaminophen (TYLENOL) 325 mg tablet Take 3 tablets (975 mg total) by mouth every 8 (eight) hours    clonazePAM (KlonoPIN) 0 5 mg tablet Take 0 5 mg by mouth daily at bedtime    escitalopram (LEXAPRO) 20 mg tablet every 24 hours    methocarbamol (ROBAXIN) 500 mg tablet Take 500 mg by mouth 4 (four) times a day as needed    Multiple Vitamin (DAILY-VICKY) TABS Take 1 tablet by mouth every morning    phenytoin extended (DILANTIN) 300 MG ER capsule Take 300 mg by mouth 2 (two) times a day    prazosin (MINIPRESS) 2 mg capsule Take 2 mg by mouth 2 (two) times a day    QUEtiapine (SEROquel) 100 mg tablet Take 100 mg by mouth daily at bedtime     No current facility-administered medications on file prior to visit  He has No Known Allergies       Review of Systems   Constitutional: Negative for chills and fever  HENT: Negative for congestion, sore throat and trouble swallowing  Eyes: Negative for visual disturbance  Respiratory: Negative for cough, shortness of breath and wheezing      Cardiovascular: Negative for chest pain, palpitations and leg swelling  Gastrointestinal: Negative for abdominal pain, blood in stool, constipation, diarrhea, nausea and vomiting  Genitourinary: Negative for dysuria and hematuria  Musculoskeletal: Positive for arthralgias (Chronic L shoulder pain )  Skin: Negative for rash  Neurological: Negative for seizures, weakness and headaches  Hematological: Negative for adenopathy  Psychiatric/Behavioral: The patient is not nervous/anxious  Objective:      /80 (BP Location: Left arm, Patient Position: Sitting, Cuff Size: Standard)   Pulse 86   Temp (!) 96 9 °F (36 1 °C) (Temporal)   Resp 18   Ht 5' 7" (1 702 m)   Wt 73 9 kg (163 lb)   SpO2 97%   BMI 25 53 kg/m²          Physical Exam   Constitutional: He is oriented to person, place, and time  He appears well-developed and well-nourished  No distress  HENT:   Head: Normocephalic and atraumatic  Mouth/Throat: No oropharyngeal exudate  Eyes: Pupils are equal, round, and reactive to light  Conjunctivae and EOM are normal  Right eye exhibits no discharge  Left eye exhibits no discharge  Neck: Normal range of motion  Cardiovascular: Normal rate, regular rhythm and normal heart sounds  Exam reveals no gallop and no friction rub  No murmur heard  Pulmonary/Chest: Effort normal  No stridor  No respiratory distress  He has no wheezes  He has no rales  Abdominal: Soft  He exhibits no distension  There is no tenderness  There is no rebound and no guarding  Musculoskeletal: He exhibits no edema or tenderness  Lymphadenopathy:     He has no cervical adenopathy  Neurological: He is alert and oriented to person, place, and time  Skin: Skin is warm  No rash noted  He is not diaphoretic  Vitals reviewed

## 2019-12-16 NOTE — ASSESSMENT & PLAN NOTE
- Last seizure in March 2019   Currently on Dilantin 300 mg BID  - Has lab orders to check phenytoin level  - [facility]  - Counseled on good dental hygiene and importance of regular dental check up while on phenytoin

## 2020-01-05 ENCOUNTER — HOSPITAL ENCOUNTER (EMERGENCY)
Facility: HOSPITAL | Age: 45
Discharge: HOME | End: 2020-01-05
Attending: EMERGENCY MEDICINE | Admitting: EMERGENCY MEDICINE
Payer: COMMERCIAL

## 2020-01-05 VITALS
TEMPERATURE: 97.8 F | WEIGHT: 154.32 LBS | DIASTOLIC BLOOD PRESSURE: 83 MMHG | HEART RATE: 89 BPM | BODY MASS INDEX: 24.17 KG/M2 | SYSTOLIC BLOOD PRESSURE: 128 MMHG | RESPIRATION RATE: 16 BRPM | OXYGEN SATURATION: 97 %

## 2020-01-05 DIAGNOSIS — R56.9 SEIZURE (HCC): Primary | ICD-10-CM

## 2020-01-05 PROCEDURE — 99284 EMERGENCY DEPT VISIT MOD MDM: CPT

## 2020-01-05 PROCEDURE — 99284 EMERGENCY DEPT VISIT MOD MDM: CPT | Performed by: EMERGENCY MEDICINE

## 2020-01-05 RX ORDER — PHENYTOIN SODIUM 100 MG/1
300 CAPSULE, EXTENDED RELEASE ORAL ONCE
Status: COMPLETED | OUTPATIENT
Start: 2020-01-05 | End: 2020-01-05

## 2020-01-05 RX ADMIN — PHENYTOIN SODIUM 300 MG: 100 CAPSULE ORAL at 08:11

## 2020-01-05 NOTE — ED NOTES
Pt found standing up in room, "Oh I shit myself"  Pt given paper scrubs to change into, with towels and wash clothes to wash up, pt edgy, pacing in room, pt given urinal to give urine specimen      Caroline Grimm RN  01/05/20 [id number]

## 2020-01-05 NOTE — ED PROVIDER NOTES
History  Chief Complaint   Patient presents with    Seizure - Prior Hx Of     pt arrives via ambulance litter reportedly " had a seizure this morning"  mother called EMS for same, pt awake,answering some questions on arrival  denies N/V     28-year-old gentleman presents with recurrent seizure  He has a history of seizures for which he takes Dilantin 300 mg in the morning and 400 mg in the evening  States he skipped his dose last night and went to bed around 3:00 a m  In the morning  He has no recollection of the seizure but was found to be having generalized tonic-clonic activity  Symptoms resolved and initially on arrival he was still somewhat postictal   Upon my evaluation he is fully awake, alert, and oriented and denies any acute complaints except for mild pain where he has an abrasion on his left shin and where he has bitten his lower lip  He denies any recent illnesses, drug/alcohol use, or other acute issues or concerns  Seizure - Prior Hx Of   Seizure activity on arrival: no    Seizure type:  Myoclonic  Initial focality:  Unable to specify  Episode characteristics: generalized shaking    Postictal symptoms: confusion    Return to baseline: yes    Severity:  Moderate  Timing:  Once  Number of seizures this episode: One  Progression:  Resolved  Context: decreased sleep and medical non-compliance    Recent head injury:  No recent head injuries  PTA treatment:  None  History of seizures: yes        Prior to Admission Medications   Prescriptions Last Dose Informant Patient Reported? Taking?    Multiple Vitamin (DAILY-VICKY) TABS   Yes No   Sig: Take 1 tablet by mouth every morning   QUEtiapine (SEROquel) 100 mg tablet   Yes No   Sig: Take 100 mg by mouth daily at bedtime   acetaminophen (TYLENOL) 325 mg tablet   No No   Sig: Take 3 tablets (975 mg total) by mouth every 8 (eight) hours   clonazePAM (KlonoPIN) 0 5 mg tablet   Yes No   Sig: Take 0 5 mg by mouth daily at bedtime   escitalopram (LEXAPRO) 20 mg tablet   Yes No   Sig: every 24 hours   methocarbamol (ROBAXIN) 500 mg tablet   Yes No   Sig: Take 500 mg by mouth 4 (four) times a day as needed   phenytoin extended (DILANTIN) 300 MG ER capsule   Yes No   Sig: Take 300 mg by mouth 2 (two) times a day   prazosin (MINIPRESS) 2 mg capsule   Yes No   Sig: Take 2 mg by mouth 2 (two) times a day      Facility-Administered Medications: None       Past Medical History:   Diagnosis Date    [facility]ipolar 1 disorder ([facility] )     Myalgia 4/17/2019    Schizophrenia ([facility] )     Seizures ([place] [address] )     Subdural hematoma ([place] [address] ) 3/26/2019    Traumatic brain injury ([place] [address] ) 3/27/2019       Past Surgical History:   Procedure Laterality Date    ROTATOR CUFF REPAIR         History reviewed  No pertinent family history  I have reviewed and agree with the history as documented  Social History     Tobacco Use    Smoking status: Current Every Day Smoker     Packs/day: 0 25    Smokeless tobacco: Current User   Substance Use Topics    Alcohol use: Not Currently    Drug use: Yes     Types: Marijuana        Review of Systems   Constitutional: Negative for activity change, chills, fatigue and fever  HENT: Negative  Negative for congestion, postnasal drip, rhinorrhea, sinus pain, sore throat and trouble swallowing  Eyes: Negative  Respiratory: Negative  Cardiovascular: Negative for chest pain  Gastrointestinal: Negative for abdominal pain, constipation, diarrhea, nausea and vomiting  Endocrine: Negative  Genitourinary: Negative  Musculoskeletal: Negative  Negative for arthralgias, back pain and myalgias  Skin: Positive for wound (Small abrasion to the left shin)  Allergic/Immunologic: Negative  Neurological: Positive for seizures  Hematological: Negative  Psychiatric/Behavioral: Negative  Physical Exam  Physical Exam   Constitutional: He is oriented to person, place, and time  He appears well-developed and well-nourished  No distress     HENT:   Head: Normocephalic and atraumatic  Right Ear: External ear normal    Left Ear: External ear normal    Nose: Nose normal    Mouth/Throat: Uvula is midline, oropharynx is clear and moist and mucous membranes are normal  No tonsillar exudate  Eyes: Pupils are equal, round, and reactive to light  Conjunctivae are normal    Neck: Neck supple  Cardiovascular: Normal rate, regular rhythm and normal heart sounds  Pulmonary/Chest: Effort normal and breath sounds normal  No respiratory distress  Abdominal: Soft  Bowel sounds are normal  He exhibits no distension  There is no tenderness  There is no guarding  Musculoskeletal: Normal range of motion  He exhibits no edema  Neurological: He is alert and oriented to person, place, and time  No cranial nerve deficit or sensory deficit  He exhibits normal muscle tone  Coordination normal    Skin: Skin is warm and dry  Capillary refill takes less than 2 seconds  He is not diaphoretic  Psychiatric: He has a normal mood and affect  His behavior is normal    Nursing note and vitals reviewed  Vital Signs  ED Triage Vitals [01/05/20 0743]   Temperature Pulse Respirations Blood Pressure SpO2   97 8 °F (36 6 °C) (!) 118 18 128/83 97 %      Temp Source Heart Rate Source Patient Position - Orthostatic VS BP Location FiO2 (%)   Tympanic Monitor Sitting Left arm --      Pain Score       --           Vitals:    01/05/20 0743 01/05/20 0808   BP: 128/83    Pulse: (!) 118 89   Patient Position - Orthostatic VS: Sitting          Visual Acuity      ED Medications  Medications   phenytoin (DILANTIN) ER capsule 300 mg (300 mg Oral Given 1/5/20 1297)       Diagnostic Studies  Results Reviewed     None                 No orders to display              Procedures  Procedures         ED Course                               MDM  Number of Diagnoses or Management Options  Seizure [facility]):   Diagnosis management comments: 60-year-old gentleman presents after having a seizure    He does admit to lack of sleep and missed his evening dose of medication  He denies any acute complaints except for his mild abrasions from the seizure  The he declines any type of extensive workup and there is no sign of acute trauma to his head from the seizure  These been provided with his morning dose of medication and will be following up with his neurologist   He is aware of reasons to return to the ER  Disposition  Final diagnoses:   Seizure [facility])     Time reflects when diagnosis was documented in both MDM as applicable and the Disposition within this note     Time User Action Codes Description Comment    1/5/2020  8:07 AM John Canchola Add [R56 9] [facility])       ED Disposition     ED Disposition Condition Date/Time Comment    Discharge Stable Sun Jan 5, 2020  8:07  [facility] discharge to home/self care  Follow-up Information    None         Patient's Medications   Discharge Prescriptions    No medications on file     No discharge procedures on file      ED Provider  Electronically Signed by           Kelly Ramirez DO  01/05/20 0372

## 2020-03-19 ENCOUNTER — OFFICE VISIT (OUTPATIENT)
Dept: FAMILY MEDICINE CLINIC | Facility: CLINIC | Age: 45
End: 2020-03-19

## 2020-03-19 VITALS
HEIGHT: 67 IN | OXYGEN SATURATION: 97 % | SYSTOLIC BLOOD PRESSURE: 126 MMHG | TEMPERATURE: 98.1 F | WEIGHT: 162.6 LBS | HEART RATE: 86 BPM | BODY MASS INDEX: 25.52 KG/M2 | RESPIRATION RATE: 16 BRPM | DIASTOLIC BLOOD PRESSURE: 80 MMHG

## 2020-03-19 DIAGNOSIS — Z23 NEED FOR VACCINATION: ICD-10-CM

## 2020-03-19 DIAGNOSIS — R56.9 SEIZURE (HCC): Primary | ICD-10-CM

## 2020-03-19 PROCEDURE — 99213 OFFICE O/P EST LOW 20 MIN: CPT | Performed by: FAMILY MEDICINE

## 2020-03-19 PROCEDURE — 3008F BODY MASS INDEX DOCD: CPT | Performed by: FAMILY MEDICINE

## 2020-03-19 PROCEDURE — T1015 CLINIC SERVICE: HCPCS | Performed by: FAMILY MEDICINE

## 2020-03-19 NOTE — ASSESSMENT & PLAN NOTE
- Currently on Dilantin 300 mg BID  - Regular dental check up while on phenytoin  - On follow up with neurology at [facility]

## 2020-03-19 NOTE — PROGRESS NOTES
Assessment/Plan:    Seizure ([facility] )  - Currently on Dilantin 300 mg BID  - Regular dental check up while on phenytoin  - On follow up with neurology at [facility]        Diagnoses and all orders for this visit:    Seizure [facility])    Need for vaccination  -     influenza vaccine, 4805-7265, quadrivalent, recombinant, PF, 0 5 mL, for patients 18 yr+ (FLUBLOK)          Subjective:      Patient ID: Doreen Bocanegra is a [address] y o  male  [address] y o male presents for f/u  H/O seizures on follow up with [facility] neurology  Remains on dilantin 300 mg BID since last visit  No new complaints at this visit  Will be going to [place] after this appt to get refills on his [address] medications       The following portions of the patient's history were reviewed and updated as appropriate: He  has a past medical history of Bipolar 1 disorder ([facility] ), Myalgia (4/17/2019), Schizophrenia ([facility] ), Seizures ([facility] ), Subdural hematoma ([facility] ) (3/26/2019), and Traumatic brain injury ([facility] ) (3/27/2019)  Patient Active Problem List    Diagnosis Date Noted    Elevated CK 03/28/2019    Bipolar disorder ([facility] ) 03/27/2019    SAH (subarachnoid hemorrhage) ([facility] ) 03/26/2019    CKD (chronic kidney disease) 03/26/2019    Complete tear of left rotator cuff 01/16/2019    Drug abuse ([facility] ) 09/14/2018    Left rotator cuff tear 09/12/2018    Acute pain of left shoulder 03/13/2018    Adhesive capsulitis of left shoulder 03/13/2018    Anxiety 01/31/2018    Bipolar disord, crnt epsd depress, severe, w psych features ([address] ) 01/31/2018    Gastroesophageal reflux disease 01/31/2018    Seizure ([facility] ) 08/01/2017    Polysubstance abuse ([facility] ) 08/01/2017     He  has a past surgical history that includes Rotator cuff repair  His family history is not on file  He  reports that he has been smoking  He has been smoking about 0 25 packs per day  He uses smokeless tobacco  He reports that he drank alcohol  He reports that he has current or past drug history  Drug: Marijuana    Current Outpatient Medications   Medication Sig Dispense Refill    clonazePAM (KlonoPIN) 0 5 mg tablet Take 0 5 mg by mouth daily at bedtime  0    escitalopram (LEXAPRO) 20 mg tablet every 24 hours      Multiple Vitamin (DAILY-VICKY) TABS Take 1 tablet by mouth every morning  1    phenytoin extended (DILANTIN) 300 MG ER capsule Take 300 mg by mouth 2 (two) times a day      prazosin (MINIPRESS) 2 mg capsule Take 2 mg by mouth 2 (two) times a day  0    QUEtiapine (SEROquel) 100 mg tablet Take 100 mg by mouth daily at bedtime  0    acetaminophen (TYLENOL) 325 mg tablet Take 3 tablets (975 mg total) by mouth every 8 (eight) hours (Patient not taking: Reported on 3/19/2020) 30 tablet 0    methocarbamol (ROBAXIN) 500 mg tablet Take 500 mg by mouth 4 (four) times a day as needed       No current facility-administered medications for this visit  Current Outpatient Medications on File Prior to Visit   Medication Sig    clonazePAM (KlonoPIN) 0 5 mg tablet Take 0 5 mg by mouth daily at bedtime    escitalopram (LEXAPRO) 20 mg tablet every 24 hours    Multiple Vitamin (DAILY-VICKY) TABS Take 1 tablet by mouth every morning    phenytoin extended (DILANTIN) 300 MG ER capsule Take 300 mg by mouth 2 (two) times a day    prazosin (MINIPRESS) 2 mg capsule Take 2 mg by mouth 2 (two) times a day    QUEtiapine (SEROquel) 100 mg tablet Take 100 mg by mouth daily at bedtime    acetaminophen (TYLENOL) 325 mg tablet Take 3 tablets (975 mg total) by mouth every 8 (eight) hours (Patient not taking: Reported on 3/19/2020)    methocarbamol (ROBAXIN) 500 mg tablet Take 500 mg by mouth 4 (four) times a day as needed     No current facility-administered medications on file prior to visit  He has No Known Allergies       Review of Systems   Constitutional: Negative for chills and fever  HENT: Negative for congestion, ear discharge, ear pain, sore throat and trouble swallowing      Respiratory: Negative for cough, shortness of breath and wheezing  Cardiovascular: Negative for chest pain, palpitations and leg swelling  Gastrointestinal: Negative for abdominal pain, blood in stool, constipation, diarrhea, nausea and vomiting  Genitourinary: Negative for dysuria and hematuria  Musculoskeletal: Negative for back pain and myalgias  Skin: Negative for rash  Neurological: Negative for seizures, weakness and headaches  Objective:      /80 (BP Location: Left arm, Patient Position: Sitting, Cuff Size: Adult)   Pulse 86   Temp 98 1 °F (36 7 °C) (Tympanic)   Resp 16   Ht 5' 7" (1 702 m)   Wt 73 8 kg (162 lb 9 6 oz)   SpO2 97%   BMI 25 47 kg/m²          Physical Exam   Constitutional: He is oriented to person, place, and time  He appears well-developed and well-nourished  No distress  HENT:   Head: Normocephalic and atraumatic  Mouth/Throat: Oropharynx is clear and moist  No oropharyngeal exudate  Eyes: Pupils are equal, round, and reactive to light  Conjunctivae and EOM are normal  Right eye exhibits no discharge  Left eye exhibits no discharge  Neck: Neck supple  Cardiovascular: Normal rate, regular rhythm and normal heart sounds  Exam reveals no gallop and no friction rub  No murmur heard  Pulmonary/Chest: Effort normal  No stridor  No respiratory distress  He has no wheezes  He has no rales  Abdominal: Soft  He exhibits no distension  There is no tenderness  There is no rebound and no guarding  Musculoskeletal: He exhibits no edema  Neurological: He is alert and oriented to person, place, and time  Skin: Skin is warm  He is not diaphoretic  Vitals reviewed

## 2020-05-07 ENCOUNTER — HOSPITAL ENCOUNTER (EMERGENCY)
Facility: HOSPITAL | Age: 45
Discharge: HOME | End: 2020-05-07
Attending: EMERGENCY MEDICINE | Admitting: EMERGENCY MEDICINE
Payer: COMMERCIAL

## 2020-05-07 VITALS
WEIGHT: 155.65 LBS | HEART RATE: 95 BPM | RESPIRATION RATE: 20 BRPM | OXYGEN SATURATION: 99 % | SYSTOLIC BLOOD PRESSURE: 151 MMHG | DIASTOLIC BLOOD PRESSURE: 83 MMHG | BODY MASS INDEX: 24.38 KG/M2 | TEMPERATURE: 97.2 F

## 2020-05-07 DIAGNOSIS — S05.01XA ABRASION OF RIGHT CORNEA, INITIAL ENCOUNTER: ICD-10-CM

## 2020-05-07 DIAGNOSIS — S05.91XA RIGHT EYE INJURY, INITIAL ENCOUNTER: Primary | ICD-10-CM

## 2020-05-07 PROCEDURE — 99284 EMERGENCY DEPT VISIT MOD MDM: CPT | Performed by: PHYSICIAN ASSISTANT

## 2020-05-07 PROCEDURE — 99283 EMERGENCY DEPT VISIT LOW MDM: CPT

## 2020-05-07 RX ORDER — TETRACAINE HYDROCHLORIDE 5 MG/ML
1 SOLUTION OPHTHALMIC ONCE
Status: COMPLETED | OUTPATIENT
Start: 2020-05-07 | End: 2020-05-07

## 2020-05-07 RX ORDER — OFLOXACIN 3 MG/ML
1 SOLUTION/ DROPS OPHTHALMIC
Qty: 5 ML | Refills: 0 | Status: SHIPPED | OUTPATIENT
Start: 2020-05-07 | End: 2022-04-25

## 2020-05-07 RX ADMIN — FLUORESCEIN SODIUM 1 STRIP: 1 STRIP OPHTHALMIC at 17:37

## 2020-05-07 RX ADMIN — TETRACAINE HYDROCHLORIDE 1 DROP: 5 SOLUTION OPHTHALMIC at 17:37

## 2021-01-21 ENCOUNTER — HOSPITAL ENCOUNTER (EMERGENCY)
Facility: HOSPITAL | Age: 46
Discharge: HOME | End: 2021-01-21
Attending: EMERGENCY MEDICINE
Payer: COMMERCIAL

## 2021-01-21 VITALS
TEMPERATURE: 96.4 F | SYSTOLIC BLOOD PRESSURE: 127 MMHG | DIASTOLIC BLOOD PRESSURE: 80 MMHG | HEART RATE: 94 BPM | BODY MASS INDEX: 27.54 KG/M2 | RESPIRATION RATE: 16 BRPM | OXYGEN SATURATION: 98 % | HEIGHT: 67 IN | WEIGHT: 175.49 LBS

## 2021-01-21 DIAGNOSIS — R56.9 SEIZURE (HCC): Primary | ICD-10-CM

## 2021-01-21 PROCEDURE — 99284 EMERGENCY DEPT VISIT MOD MDM: CPT

## 2021-01-21 PROCEDURE — 99282 EMERGENCY DEPT VISIT SF MDM: CPT | Performed by: EMERGENCY MEDICINE

## 2021-01-21 NOTE — ED NOTES
Pt was able to ambulate in the hallway with staff  Pt would like to leave, does not wish to have blood work drawn      Pt has a steady gait and is AOx4       Maricarmen More RN  01/21/21 4868

## 2021-01-21 NOTE — Clinical Note
Claudette Alfredo was seen and treated in our emergency department on 1/21/2021  Diagnosis:     Disan    He may return on this date: 01/24/2021         If you have any questions or concerns, please don't hesitate to call        Fabiola Mendoza DO    ______________________________           _______________          _______________  Hospital Representative                              Date                                Time

## 2021-01-21 NOTE — ED PROVIDER NOTES
History  Chief Complaint   Patient presents with    Seizure - Prior Hx Of     Sister heard loud noises and witnessed pt having a seizure     Patient is a 52-year-old male who has presented via EMS for concerns of seizure-like activity  Per EMS he was sleeping when his significant other noticed generalized shaking  Unsure of how long it lasted  Patient without seizure-like activity on EMS arrival or during transport  No medications given prior to arrival   Patient arrives awake oriented but not alert appears to be in a postictal state  He is declining to have an evaluation done including labs or imaging  He does note that he had his birthday yesterday, states he was out partying with friends using marijuana possibly alcohol  He states that he is not sure how he even got home last night  He is able to verbalize the risks of not undergoing testing at this time  Seizure - Prior Hx Of      Prior to Admission Medications   Prescriptions Last Dose Informant Patient Reported? Taking?    Multiple Vitamin (DAILY-VICKY) TABS   Yes No   Sig: Take 1 tablet by mouth every morning   QUEtiapine (SEROquel) 100 mg tablet   Yes No   Sig: Take 100 mg by mouth daily at bedtime   acetaminophen (TYLENOL) 325 mg tablet   No No   Sig: Take 3 tablets (975 mg total) by mouth every 8 (eight) hours   Patient not taking: Reported on 3/19/2020   clonazePAM (KlonoPIN) 0 5 mg tablet   Yes No   Sig: Take 0 5 mg by mouth daily at bedtime   escitalopram (LEXAPRO) 20 mg tablet   Yes No   Sig: every 24 hours   methocarbamol (ROBAXIN) 500 mg tablet   Yes No   Sig: Take 500 mg by mouth 4 (four) times a day as needed   ofloxacin (OCUFLOX) 0 3 % ophthalmic solution   No No   Sig: Administer 1 drop to the right eye every hour while awake   phenytoin extended (DILANTIN) 300 MG ER capsule   Yes No   Sig: Take 300 mg by mouth 2 (two) times a day   prazosin (MINIPRESS) 2 mg capsule   Yes No   Sig: Take 2 mg by mouth 2 (two) times a day Facility-Administered Medications: None       Past Medical History:   Diagnosis Date    [facility] 1 disorder ([facility] )     Myalgia 4/17/2019    Schizophrenia ([facility] )     [facility]ures [facility]     Subdural hematoma ([place] [address] ) 3/26/2019    Traumatic brain injury ([place] [address] ) 3/27/2019       Past Surgical History:   Procedure Laterality Date    ROTATOR CUFF REPAIR         History reviewed  No pertinent family history  I have reviewed and agree with the history as documented  E-Cigarette/Vaping    E-Cigarette Use Never User      E-Cigarette/Vaping Substances     Social History     Tobacco Use    Smoking status: Current Every Day Smoker     Packs/day: 0 25    Smokeless tobacco: Current User   Substance Use Topics    Alcohol use: Not Currently    Drug use: Yes     Types: Marijuana       Review of Systems   Constitutional: Negative  Negative for chills and fever  HENT: Negative  Negative for rhinorrhea, sore throat, trouble swallowing and voice change  Eyes: Negative  Negative for pain and visual disturbance  Respiratory: Negative  Negative for cough, shortness of breath and wheezing  Cardiovascular: Negative  Negative for chest pain and palpitations  Gastrointestinal: Negative for abdominal pain, diarrhea, nausea and vomiting  Genitourinary: Negative  Negative for dysuria and frequency  Musculoskeletal: Negative  Negative for neck pain and neck stiffness  Skin: Negative  Negative for rash  Neurological: Positive for seizures  Negative for dizziness, speech difficulty, weakness, light-headedness and numbness  Physical Exam  Physical Exam  Vitals signs and nursing note reviewed  Constitutional:       General: He is not in acute distress  Appearance: He is well-developed  HENT:      Head: Normocephalic and atraumatic  Eyes:      Conjunctiva/sclera: Conjunctivae normal       Pupils: Pupils are equal, round, and reactive to light     Neck:      Musculoskeletal: Normal range of motion and neck supple  Trachea: No tracheal deviation  Cardiovascular:      Rate and Rhythm: Normal rate and regular rhythm  Pulmonary:      Effort: Pulmonary effort is normal  No respiratory distress  Breath sounds: Normal breath sounds  No wheezing or rales  Abdominal:      General: Bowel sounds are normal  There is no distension  Palpations: Abdomen is soft  Tenderness: There is no abdominal tenderness  There is no guarding or rebound  Musculoskeletal: Normal range of motion  General: No tenderness or deformity  Skin:     General: Skin is warm and dry  Capillary Refill: Capillary refill takes less than 2 seconds  Findings: No rash  Neurological:      Mental Status: He is alert and oriented to person, place, and time  Psychiatric:         Behavior: Behavior normal          Vital Signs  ED Triage Vitals [01/21/21 0714]   Temperature Pulse Respirations Blood Pressure SpO2   (!) 96 4 °F (35 8 °C) (!) 120 20 162/83 98 %      Temp Source Heart Rate Source Patient Position - Orthostatic VS BP Location FiO2 (%)   Tympanic Monitor Lying Right arm --      Pain Score       No Pain           Vitals:    01/21/21 0714 01/21/21 0745 01/21/21 0800 01/21/21 0830   BP: 162/83 118/83 120/81 127/80   Pulse: (!) 120 93 95 94   Patient Position - Orthostatic VS: Lying Lying Lying Lying         Visual Acuity      ED Medications  Medications - No data to display    Diagnostic Studies  Results Reviewed     None                 No orders to display              Procedures  Procedures         ED Course                                           MDM  Number of Diagnoses or Management Options  Seizure [facility]):   Diagnosis management comments: Patient is a 71-year-old male who arrives for concerns of seizure-like activity  He declined to have any blood work or imaging done but was agreeable to be monitored in the emergency room for hour period he became more awake alert oriented    His repeat neurologic exam was benign  I again offered him testing and advanced imaging in the emergency department he declined  He called his significant other who picked him up from the emergency room  He ambulated from the ER to the waiting room without difficulty  He was advised he could return any time for any reason for further care and should follow up with primary care doctor and neurologist   He was offered refill his prescriptions and he stated he had plenty of medication at home  Disposition  Final diagnoses:   Seizure [facility])     Time reflects when diagnosis was documented in both MDM as applicable and the Disposition within this note     Time User Action Codes Description Comment    1/21/2021  9:15 AM Delia Beck Add [R56 9] Seizure [facility])       ED Disposition     ED Disposition Condition Date/Time Comment    Discharge Stable Thu Jan 21, 2021  [address] discharge to home/self care              Follow-up Information     Follow up With Specialties Details Why Contact Info Additional [id number] In 1 week  [address], [address] [facility] [id number]  [address], [address] [place], [place], [address]    [facility]   [address] [place] [address] Drive [id number]  121 [facility], [address] [place] [address], [address]          Discharge Medication List as of 1/21/2021  9:15 AM      CONTINUE these medications which have NOT CHANGED    Details   acetaminophen (TYLENOL) 325 mg tablet Take 3 tablets (975 mg total) by mouth every 8 (eight) hours, Starting Mon 4/1/2019, No Print      clonazePAM (KlonoPIN) 0 5 mg tablet Take 0 5 mg by mouth daily at bedtime, Starting Sun 12/23/2018, Historical Med      escitalopram (LEXAPRO) 20 mg tablet every 24 hours, Historical Med      methocarbamol (ROBAXIN) 500 mg tablet Take 500 mg by mouth 4 (four) times a day as needed, Starting Wed 1/30/2019, Historical Med      Multiple Vitamin (DAILY-VICKY) TABS Take 1 tablet by mouth every morning, Starting Sat 11/30/2019, Historical Med      ofloxacin (OCUFLOX) 0 3 % ophthalmic solution Administer 1 drop to the right eye every hour while awake, Starting Thu 5/7/2020, Print      phenytoin extended (DILANTIN) 300 MG ER capsule Take 300 mg by mouth 2 (two) times a day, Historical Med      prazosin (MINIPRESS) 2 mg capsule Take 2 mg by mouth 2 (two) times a day, Starting Sun 12/23/2018, Historical Med      QUEtiapine (SEROquel) 100 mg tablet Take 100 mg by mouth daily at bedtime, Starting Sun 12/23/2018, Historical Med           No discharge procedures on file      PDMP Review     None          ED Provider  Electronically Signed by           Clemencia Madison DO  01/21/21 9631

## 2021-03-14 ENCOUNTER — HOSPITAL ENCOUNTER (EMERGENCY)
Facility: HOSPITAL | Age: 46
Discharge: HOME | End: 2021-03-14
Attending: EMERGENCY MEDICINE | Admitting: EMERGENCY MEDICINE
Payer: COMMERCIAL

## 2021-03-14 VITALS
DIASTOLIC BLOOD PRESSURE: 77 MMHG | HEART RATE: 106 BPM | TEMPERATURE: 97.8 F | RESPIRATION RATE: 18 BRPM | OXYGEN SATURATION: 96 % | SYSTOLIC BLOOD PRESSURE: 129 MMHG | WEIGHT: 163 LBS | BODY MASS INDEX: 25.53 KG/M2

## 2021-03-14 DIAGNOSIS — H10.32 ACUTE BACTERIAL CONJUNCTIVITIS OF LEFT EYE: Primary | ICD-10-CM

## 2021-03-14 PROCEDURE — 99284 EMERGENCY DEPT VISIT MOD MDM: CPT | Performed by: PHYSICIAN ASSISTANT

## 2021-03-14 PROCEDURE — 99282 EMERGENCY DEPT VISIT SF MDM: CPT

## 2021-03-14 RX ORDER — TETRACAINE HYDROCHLORIDE 5 MG/ML
2 SOLUTION OPHTHALMIC ONCE
Status: DISCONTINUED | OUTPATIENT
Start: 2021-03-14 | End: 2021-03-14 | Stop reason: HOSPADM

## 2021-03-14 RX ORDER — ERYTHROMYCIN 5 MG/G
OINTMENT OPHTHALMIC
Qty: 3.5 G | Refills: 0 | Status: SHIPPED | OUTPATIENT
Start: 2021-03-14 | End: 2022-04-25

## 2021-03-14 NOTE — ED PROVIDER NOTES
History  Chief Complaint   Patient presents with    Eye Redness     eye redness/swelling/drainage, worse in left eye   started yesterday     Patient is a 41 y/o male presenting to the ED for evaluation of eye redness and irritation  Pt states yesterday he began with irritation, tearing and redness to left eye  Pt also woke up with crusting and drainage noted  Pt has used visine without improvement  Pt denies contact use or glasses use  Pt does not see Ophthalmology  Pt without fever, vision changes, pain with EOM, facial swelling  Prior to Admission Medications   Prescriptions Last Dose Informant Patient Reported? Taking? Multiple Vitamin (DAILY-VICKY) TABS   Yes No   Sig: Take 1 tablet by mouth every morning   QUEtiapine (SEROquel) 100 mg tablet   Yes No   Sig: Take 100 mg by mouth daily at bedtime   acetaminophen (TYLENOL) 325 mg tablet   No No   Sig: Take 3 tablets (975 mg total) by mouth every 8 (eight) hours   Patient not taking: Reported on 3/19/2020   clonazePAM (KlonoPIN) 0 5 mg tablet   Yes No   Sig: Take 0 5 mg by mouth daily at bedtime   escitalopram (LEXAPRO) 20 mg tablet   Yes No   Sig: every 24 hours   methocarbamol (ROBAXIN) 500 mg tablet   Yes No   Sig: Take 500 mg by mouth 4 (four) times a day as needed   ofloxacin (OCUFLOX) 0 3 % ophthalmic solution   No No   Sig: Administer 1 drop to the right eye every hour while awake   phenytoin extended (DILANTIN) 300 MG ER capsule   Yes No   Sig: Take 300 mg by mouth 2 (two) times a day   prazosin (MINIPRESS) 2 mg capsule   Yes No   Sig: Take 2 mg by mouth 2 (two) times a day      Facility-Administered Medications: None       Past Medical History:   Diagnosis Date    Bipolar 1 disorder ([facility] )     Myalgia 4/17/2019    Schizophrenia ([facility])     Seizures ([facility])     [facility]oma ([facility] ) 3/26/2019    Traumatic brain injury ([facility] ) 3/27/2019       Past Surgical History:   Procedure Laterality Date    ROTATOR CUFF REPAIR         History reviewed   No pertinent family history  I have reviewed and agree with the history as documented  E-Cigarette/Vaping    E-Cigarette Use Never User      E-Cigarette/Vaping Substances     Social History     Tobacco Use    Smoking status: Current Every Day Smoker     Packs/day: 0 25    Smokeless tobacco: Current User   Substance Use Topics    Alcohol use: Not Currently    Drug use: Yes     Types: Marijuana       Review of Systems   Eyes: Positive for discharge, redness and itching  All other systems reviewed and are negative  Physical Exam  Physical Exam  Constitutional:       Appearance: Normal appearance  HENT:      Head: Normocephalic and atraumatic  Right Ear: External ear normal       Left Ear: External ear normal       Nose: Nose normal       Mouth/Throat:      Lips: Pink  Mouth: Mucous membranes are moist    Eyes:      General: Vision grossly intact  Extraocular Movements: Extraocular movements intact  Conjunctiva/sclera:      Left eye: Left conjunctiva is injected  Pupils: Pupils are equal, round, and reactive to light  Comments: Patient refusing fluorescein exam   Neck:      Musculoskeletal: Normal range of motion and neck supple  Pulmonary:      Effort: No tachypnea or respiratory distress  Skin:     General: Skin is warm  Capillary Refill: Capillary refill takes less than 2 seconds  Neurological:      Mental Status: He is alert and oriented to person, place, and time  GCS: GCS eye subscore is 4  GCS verbal subscore is 5  GCS motor subscore is 6     Psychiatric:         Mood and Affect: Mood and affect normal          Speech: Speech normal          Vital Signs  ED Triage Vitals   Temperature Pulse Respirations Blood Pressure SpO2   03/14/21 1605 03/14/21 1605 03/14/21 1605 03/14/21 1608 03/14/21 1605   97 8 °F (36 6 °C) (!) 106 18 129/77 96 %      Temp Source Heart Rate Source Patient Position - Orthostatic VS BP Location FiO2 (%)   03/14/21 1605 03/14/21 1605 03/14/21 1605 03/14/21 1605 --   Tympanic Monitor Lying Left arm       Pain Score       --                  Vitals:    03/14/21 1605 03/14/21 1608   BP:  129/77   Pulse: (!) 106    Patient Position - Orthostatic VS: Lying          Visual Acuity      ED Medications  Medications - No data to display    Diagnostic Studies  Results Reviewed     None                 No orders to display              Procedures  Procedures         ED Course                             SBIRT 20yo+      Most Recent Value   SBIRT (24 yo +)   In order to provide better care to our patients, we are screening all of our patients for alcohol and drug use  Would it be okay to ask you these screening questions? No Filed at: 03/14/2021 1609                    MDM  Number of Diagnoses or Management Options  Acute bacterial conjunctivitis of left eye: new and does not require workup  Diagnosis management comments: Patient is a 41 y/o male presenting to the ED for evaluation of eye redness and irritation  Pt states yesterday he began with irritation, tearing and redness to left eye  Pt also woke up with crusting and drainage noted  Pt has used visine without improvement  Pt denies contact use or glasses use  Patient refusing fluorescein eye stain, advised him I am unable to rule out more serious etiology of eye redness without a complete exam, patient understands, states he just wants medication for it  Visual acuity above  Rx for erythromycin ointment to cover for bacterial conjunctivitis provided  Advised f/u with [facility] if sx do not improve  Patient verbalizes understanding and agrees with plan  The management plan was discussed in detail with the patient at bedside and all questions were answered  Prior to discharge, I provided both verbal and written instructions  I discussed with the patient the signs and symptoms for which to return to the emergency department    All questions were answered and patient was comfortable with the plan of care and discharged to home  The patient agrees to return to the Emergency Department for concerns and/or progression of illness  Disposition  Final diagnoses:   Acute bacterial conjunctivitis of left eye     Time reflects when diagnosis was documented in both MDM as applicable and the Disposition within this note     Time User Action Codes Description Comment    3/14/2021  4:37 PM Gabe Hernández Add [H10 32] Acute bacterial conjunctivitis of left eye       ED Disposition     ED Disposition Condition Date/Time Comment    Discharge Stable Sun Mar 14, 2021  4:37  [facility] discharge to home/self care  Follow-up Information     Follow up With Specialties Details [facility] Schedule an appointment as soon as possible for a visit   [facility] [address]   [phone]            Discharge Medication List as of 3/14/2021  4:38 PM      START taking these medications    Details   erythromycin (ILOTYCIN) ophthalmic ointment Place a 1/2 inch ribbon of ointment into both lower eyelids 4-6 times daily for 5-7 days  , Print         CONTINUE these medications which have NOT CHANGED    Details   acetaminophen (TYLENOL) 325 mg tablet Take 3 tablets (975 mg total) by mouth every 8 (eight) hours, Starting Mon 4/1/2019, No Print      clonazePAM (KlonoPIN) 0 5 mg tablet Take 0 5 mg by mouth daily at bedtime, Starting Sun 12/23/2018, Historical Med      escitalopram (LEXAPRO) 20 mg tablet every 24 hours, Historical Med      methocarbamol (ROBAXIN) 500 mg tablet Take 500 mg by mouth 4 (four) times a day as needed, Starting Wed 1/30/2019, Historical Med      Multiple Vitamin (DAILY-VICKY) TABS Take 1 tablet by mouth every morning, Starting Sat 11/30/2019, Historical Med      ofloxacin (OCUFLOX) 0 3 % ophthalmic solution Administer 1 drop to the right eye every hour while awake, Starting Thu 5/7/2020, Print      phenytoin extended (DILANTIN) 300 MG ER capsule Take 300 mg by mouth 2 (two) times a day, Historical Med      prazosin (MINIPRESS) 2 mg capsule Take 2 mg by mouth 2 (two) times a day, Starting Sun 12/23/2018, Historical Med      QUEtiapine (SEROquel) 100 mg tablet Take 100 mg by mouth daily at bedtime, Starting Sun 12/23/2018, Historical Med           No discharge procedures on file      PDMP Review     None          ED Provider  Electronically Signed by           Sergio Eckert PA-C  03/16/21 0755

## 2021-12-09 ENCOUNTER — APPOINTMENT (EMERGENCY)
Dept: RADIOLOGY | Facility: HOSPITAL | Age: 46
End: 2021-12-09
Payer: COMMERCIAL

## 2021-12-09 ENCOUNTER — HOSPITAL ENCOUNTER (EMERGENCY)
Facility: HOSPITAL | Age: 46
Discharge: HOME | End: 2021-12-09
Attending: EMERGENCY MEDICINE
Payer: COMMERCIAL

## 2021-12-09 VITALS
OXYGEN SATURATION: 93 % | SYSTOLIC BLOOD PRESSURE: 142 MMHG | RESPIRATION RATE: 16 BRPM | TEMPERATURE: 97.8 F | DIASTOLIC BLOOD PRESSURE: 84 MMHG | HEART RATE: 73 BPM

## 2021-12-09 DIAGNOSIS — R06.2 WHEEZING: Primary | ICD-10-CM

## 2021-12-09 LAB
ANION GAP SERPL CALCULATED.3IONS-SCNC: 6 MMOL/L (ref 4–13)
ATRIAL RATE: 77 BPM
BASE EX.OXY STD BLDV CALC-SCNC: 75.2 % (ref 60–80)
BASE EXCESS BLDV CALC-SCNC: -0.7 MMOL/L
BASOPHILS # BLD AUTO: 0.11 THOUSANDS/ΜL (ref 0–0.1)
BASOPHILS NFR BLD AUTO: 1 % (ref 0–1)
BUN SERPL-MCNC: 13 MG/DL (ref 5–25)
CALCIUM SERPL-MCNC: 9.2 MG/DL (ref 8.3–10.1)
CHLORIDE SERPL-SCNC: 107 MMOL/L (ref 100–108)
CO2 SERPL-SCNC: 24 MMOL/L (ref 21–32)
CREAT SERPL-MCNC: 0.98 MG/DL (ref 0.6–1.3)
EOSINOPHIL # BLD AUTO: 1.53 THOUSAND/ΜL (ref 0–0.61)
EOSINOPHIL NFR BLD AUTO: 15 % (ref 0–6)
ERYTHROCYTE [DISTWIDTH] IN BLOOD BY AUTOMATED COUNT: 13.4 % (ref 11.6–15.1)
FLUAV RNA RESP QL NAA+PROBE: NEGATIVE
FLUBV RNA RESP QL NAA+PROBE: NEGATIVE
GFR SERPL CREATININE-BSD FRML MDRD: 93 ML/MIN/1.73SQ M
GLUCOSE SERPL-MCNC: 101 MG/DL (ref 65–140)
HCO3 BLDV-SCNC: 24.4 MMOL/L (ref 24–30)
HCT VFR BLD AUTO: 49.2 % (ref 36.5–49.3)
HGB BLD-MCNC: 16 G/DL (ref 12–17)
IMM GRANULOCYTES # BLD AUTO: 0.02 THOUSAND/UL (ref 0–0.2)
IMM GRANULOCYTES NFR BLD AUTO: 0 % (ref 0–2)
LYMPHOCYTES # BLD AUTO: 2.66 THOUSANDS/ΜL (ref 0.6–4.47)
LYMPHOCYTES NFR BLD AUTO: 26 % (ref 14–44)
MCH RBC QN AUTO: 27.1 PG (ref 26.8–34.3)
MCHC RBC AUTO-ENTMCNC: 32.5 G/DL (ref 31.4–37.4)
MCV RBC AUTO: 83 FL (ref 82–98)
MONOCYTES # BLD AUTO: 0.66 THOUSAND/ΜL (ref 0.17–1.22)
MONOCYTES NFR BLD AUTO: 6 % (ref 4–12)
NEUTROPHILS # BLD AUTO: 5.34 THOUSANDS/ΜL (ref 1.85–7.62)
NEUTS SEG NFR BLD AUTO: 52 % (ref 43–75)
NRBC BLD AUTO-RTO: 0 /100 WBCS
O2 CT BLDV-SCNC: 17.8 ML/DL
P AXIS: 87 DEGREES
PCO2 BLDV: 41.7 MM HG (ref 42–50)
PH BLDV: 7.38 [PH] (ref 7.3–7.4)
PLATELET # BLD AUTO: 312 THOUSANDS/UL (ref 149–390)
PMV BLD AUTO: 10.3 FL (ref 8.9–12.7)
PO2 BLDV: 40.5 MM HG (ref 35–45)
POTASSIUM SERPL-SCNC: 3.9 MMOL/L (ref 3.5–5.3)
PR INTERVAL: 132 MS
QRS AXIS: 74 DEGREES
QRSD INTERVAL: 84 MS
QT INTERVAL: 376 MS
QTC INTERVAL: 425 MS
RBC # BLD AUTO: 5.91 MILLION/UL (ref 3.88–5.62)
RSV RNA RESP QL NAA+PROBE: NEGATIVE
SARS-COV-2 RNA RESP QL NAA+PROBE: NEGATIVE
SODIUM SERPL-SCNC: 137 MMOL/L (ref 136–145)
T WAVE AXIS: 69 DEGREES
VENTRICULAR RATE: 77 BPM
WBC # BLD AUTO: 10.32 THOUSAND/UL (ref 4.31–10.16)

## 2021-12-09 PROCEDURE — 99285 EMERGENCY DEPT VISIT HI MDM: CPT | Performed by: EMERGENCY MEDICINE

## 2021-12-09 PROCEDURE — 93005 ELECTROCARDIOGRAM TRACING: CPT

## 2021-12-09 PROCEDURE — 99285 EMERGENCY DEPT VISIT HI MDM: CPT

## 2021-12-09 PROCEDURE — 96374 THER/PROPH/DIAG INJ IV PUSH: CPT

## 2021-12-09 PROCEDURE — 93010 ELECTROCARDIOGRAM REPORT: CPT | Performed by: INTERNAL MEDICINE

## 2021-12-09 PROCEDURE — 0241U HB NFCT DS VIR RESP RNA 4 TRGT: CPT | Performed by: EMERGENCY MEDICINE

## 2021-12-09 PROCEDURE — 82805 BLOOD GASES W/O2 SATURATION: CPT | Performed by: EMERGENCY MEDICINE

## 2021-12-09 PROCEDURE — 80048 BASIC METABOLIC PNL TOTAL CA: CPT | Performed by: EMERGENCY MEDICINE

## 2021-12-09 PROCEDURE — 94640 AIRWAY INHALATION TREATMENT: CPT

## 2021-12-09 PROCEDURE — 85025 COMPLETE CBC W/AUTO DIFF WBC: CPT | Performed by: EMERGENCY MEDICINE

## 2021-12-09 PROCEDURE — 71045 X-RAY EXAM CHEST 1 VIEW: CPT

## 2021-12-09 RX ORDER — SODIUM CHLORIDE FOR INHALATION 0.9 %
3 VIAL, NEBULIZER (ML) INHALATION ONCE
Status: COMPLETED | OUTPATIENT
Start: 2021-12-09 | End: 2021-12-09

## 2021-12-09 RX ORDER — LORAZEPAM 1 MG/1
1 TABLET ORAL ONCE
Status: DISCONTINUED | OUTPATIENT
Start: 2021-12-09 | End: 2021-12-09 | Stop reason: HOSPADM

## 2021-12-09 RX ORDER — PREDNISONE 20 MG/1
40 TABLET ORAL DAILY
Qty: 10 TABLET | Refills: 0 | Status: SHIPPED | OUTPATIENT
Start: 2021-12-09 | End: 2021-12-14

## 2021-12-09 RX ORDER — ALBUTEROL SULFATE 90 UG/1
2 AEROSOL, METERED RESPIRATORY (INHALATION) EVERY 4 HOURS PRN
Qty: 18 G | Refills: 0 | Status: SHIPPED | OUTPATIENT
Start: 2021-12-09 | End: 2021-12-14

## 2021-12-09 RX ORDER — METHYLPREDNISOLONE SODIUM SUCCINATE 125 MG/2ML
125 INJECTION, POWDER, LYOPHILIZED, FOR SOLUTION INTRAMUSCULAR; INTRAVENOUS ONCE
Status: COMPLETED | OUTPATIENT
Start: 2021-12-09 | End: 2021-12-09

## 2021-12-09 RX ADMIN — ALBUTEROL SULFATE 10 MG: 2.5 SOLUTION RESPIRATORY (INHALATION) at 06:47

## 2021-12-09 RX ADMIN — IPRATROPIUM BROMIDE 1 MG: 0.5 SOLUTION RESPIRATORY (INHALATION) at 06:47

## 2021-12-09 RX ADMIN — ISODIUM CHLORIDE 3 ML: 0.03 SOLUTION RESPIRATORY (INHALATION) at 06:47

## 2021-12-09 RX ADMIN — METHYLPREDNISOLONE SODIUM SUCCINATE 125 MG: 125 INJECTION, POWDER, FOR SOLUTION INTRAMUSCULAR; INTRAVENOUS at 06:47

## 2022-03-30 ENCOUNTER — TELEPHONE (OUTPATIENT)
Dept: FAMILY MEDICINE CLINIC | Facility: CLINIC | Age: 47
End: 2022-03-30

## 2022-03-30 ENCOUNTER — OFFICE VISIT (OUTPATIENT)
Dept: FAMILY MEDICINE CLINIC | Facility: CLINIC | Age: 47
End: 2022-03-30

## 2022-03-30 VITALS
TEMPERATURE: 96.8 F | SYSTOLIC BLOOD PRESSURE: 136 MMHG | HEART RATE: 111 BPM | WEIGHT: 175 LBS | OXYGEN SATURATION: 91 % | RESPIRATION RATE: 18 BRPM | BODY MASS INDEX: 27.41 KG/M2 | DIASTOLIC BLOOD PRESSURE: 88 MMHG

## 2022-03-30 DIAGNOSIS — J45.901 MODERATE ASTHMA WITH EXACERBATION, UNSPECIFIED WHETHER PERSISTENT: Primary | ICD-10-CM

## 2022-03-30 PROCEDURE — 94640 AIRWAY INHALATION TREATMENT: CPT | Performed by: NURSE PRACTITIONER

## 2022-03-30 PROCEDURE — 99204 OFFICE O/P NEW MOD 45 MIN: CPT | Performed by: NURSE PRACTITIONER

## 2022-03-30 RX ORDER — IPRATROPIUM BROMIDE AND ALBUTEROL SULFATE 2.5; .5 MG/3ML; MG/3ML
3 SOLUTION RESPIRATORY (INHALATION) ONCE
Status: COMPLETED | OUTPATIENT
Start: 2022-03-30 | End: 2022-03-30

## 2022-03-30 RX ORDER — IPRATROPIUM BROMIDE AND ALBUTEROL SULFATE 2.5; .5 MG/3ML; MG/3ML
3 SOLUTION RESPIRATORY (INHALATION) 4 TIMES DAILY
Qty: 30 ML | Refills: 1 | Status: SHIPPED | OUTPATIENT
Start: 2022-03-30 | End: 2022-05-03

## 2022-03-30 RX ORDER — ALBUTEROL SULFATE 90 UG/1
180 POWDER, METERED RESPIRATORY (INHALATION) EVERY 4 HOURS
COMMUNITY
Start: 2022-02-21

## 2022-03-30 RX ADMIN — IPRATROPIUM BROMIDE AND ALBUTEROL SULFATE 3 ML: 2.5; .5 SOLUTION RESPIRATORY (INHALATION) at 15:46

## 2022-03-30 NOTE — PROGRESS NOTES
Assessment/Plan:    Problem List Items Addressed This Visit        Respiratory    Moderate asthma with exacerbation - Primary     ·  Uncontrolled, oxygen 91-93% on presentation  · DuoNeb administered in office, oxygen stable at 96%  · He must  the Medrol Dosepak provided by [facility] ED  · Start duo neb treatments with new nebulizer ordered today  ·   Step of treatment to Advair disc and explained proper use  ·   Advised he can be using the albuterol once in our every hour, this is likely the reason for his tachycardia  ·  follow-up in 1 week for re-evaluation  ·  strongly insisted that he quit smoking         Relevant Medications    fluticasone-salmeterol (Advair Diskus) 100-50 mcg/dose inhaler    Albuterol Sulfate (ProAir RespiClick) 396 (90 Base) MCG/ACT AEPB    ipratropium-albuterol (DUO-NEB) 0 5-2 5 mg/3 mL nebulizer solution    Other Relevant Orders    Ambulatory Referral to Smoking Cessation Program    Nebulizer    Nebulizer Supplies            Return in about 1 week (around 4/6/2022) for Recheck asthma  A chart review was performed and previous primary care visit notes were reviewed  All applicable imaging studies were reviewed and images were reviewed personally  All applicable laboratory studies were reviewed personally  Care everywhere review was performed if  available and all pertinent notes were reviewed  Subjective:     HPI: Navjot Powers is a 56 y o  male who  has a past medical history of Bipolar 1 disorder ([address] ), Myalgia, Schizophrenia ([address] ), Seizures ([address] ), Subdural hematoma ([facility] ), and Traumatic brain injury ([facility] [address] )  who presented to the office today  To establish care  He was seen in [place] for acute asthma exacerbation  He was given multiple DuoNeb treatments and eventually discharged with albuterol and Medrol Dosepak  Did not  the Medrol Dosepak yet  Today he presents dyspnea and saturating 91-93%    Had DuoNeb treatment in clinic and rebounded to 96% and is stable  His significant other requests a new nebulizer machine  The following portions of the patient's history were reviewed and updated as appropriate: allergies, current medications, past family history, past medical history, past social history, past surgical history, and problem list     Current Outpatient Medications on File Prior to Visit   Medication Sig Dispense Refill    Albuterol Sulfate (ProAir RespiClick) 303 (90 Base) MCG/ACT AEPB Inhale 180 mcg every 4 (four) hours      acetaminophen (TYLENOL) 325 mg tablet Take 3 tablets (975 mg total) by mouth every 8 (eight) hours (Patient not taking: Reported on 3/19/2020) 30 tablet 0    clonazePAM (KlonoPIN) 0 5 mg tablet Take 0 5 mg by mouth daily at bedtime  0    erythromycin (ILOTYCIN) ophthalmic ointment Place a 1/2 inch ribbon of ointment into both lower eyelids 4-6 times daily for 5-7 days  3 5 g 0    escitalopram (LEXAPRO) 20 mg tablet every 24 hours      methocarbamol (ROBAXIN) 500 mg tablet Take 500 mg by mouth 4 (four) times a day as needed      Multiple Vitamin (DAILY-VICKY) TABS Take 1 tablet by mouth every morning  1    ofloxacin (OCUFLOX) 0 3 % ophthalmic solution Administer 1 drop to the right eye every hour while awake 5 mL 0    phenytoin extended (DILANTIN) 300 MG ER capsule Take 300 mg by mouth 2 (two) times a day      prazosin (MINIPRESS) 2 mg capsule Take 2 mg by mouth 2 (two) times a day  0    QUEtiapine (SEROquel) 100 mg tablet Take 100 mg by mouth daily at bedtime  0     No current facility-administered medications on file prior to visit  Review of Systems   Constitutional: Negative  HENT: Negative  Eyes: Negative  Respiratory: Positive for shortness of breath and wheezing  Cardiovascular: Negative  Gastrointestinal: Negative  Genitourinary: Negative  Musculoskeletal: Negative  Neurological: Negative  Psychiatric/Behavioral: Negative                Objective:    /88 (BP Location: Left arm, Patient Position: Sitting, Cuff Size: Adult)   Pulse (!) 111   Temp (!) 96 8 °F (36 °C) (Temporal)   Resp 18   Wt 79 4 kg (175 lb)   SpO2 91%   BMI 27 41 kg/m²     Physical Exam  Constitutional:       Appearance: Normal appearance  He is normal weight  HENT:      Head: Normocephalic  Right Ear: External ear normal       Left Ear: External ear normal       Nose: Nose normal       Mouth/Throat:      Mouth: Mucous membranes are moist    Eyes:      Extraocular Movements: Extraocular movements intact  Conjunctiva/sclera: Conjunctivae normal    Cardiovascular:      Rate and Rhythm: Normal rate and regular rhythm  Pulses: Normal pulses  Heart sounds: Normal heart sounds  Pulmonary:      Effort: Accessory muscle usage present  No respiratory distress  Breath sounds: Wheezing (generalized) present  Musculoskeletal:         General: No tenderness or signs of injury  Normal range of motion  Cervical back: Normal range of motion  Right lower leg: No edema  Left lower leg: No edema  Skin:     General: Skin is warm and dry  Capillary Refill: Capillary refill takes less than 2 seconds  Findings: No bruising, lesion or rash  Neurological:      General: No focal deficit present  Mental Status: He is alert and oriented to person, place, and time  Psychiatric:         Mood and Affect: Mood normal          Behavior: Behavior normal          Thought Content: Thought content normal          BHARATH Nunez  03/31/22  4:05 PM    There are no Patient Instructions on file for this visit

## 2022-03-30 NOTE — TELEPHONE ENCOUNTER
Received order in my bin  Order has been placed VIA online portal  [employer] will contact the pt directly after order is electronically signed by Eloina Ramirez      Customer service# [phone]

## 2022-03-31 NOTE — ASSESSMENT & PLAN NOTE
·  Uncontrolled, oxygen 91-93% on presentation  · DuoNeb administered in office, oxygen stable at 96%  · He must  the Medrol Dosepak provided by [facility] ED  · Start duo neb treatments with new nebulizer ordered today    ·   Step of treatment to Advair disc and explained proper use  ·   Advised he can be using the albuterol once in our every hour, this is likely the reason for his tachycardia  ·  follow-up in 1 week for re-evaluation  ·  strongly insisted that he quit smoking

## 2022-04-11 ENCOUNTER — TELEPHONE (OUTPATIENT)
Dept: PULMONOLOGY | Facility: MEDICAL CENTER | Age: 47
End: 2022-04-11

## 2022-04-11 NOTE — TELEPHONE ENCOUNTER
Attempted to call patient and unable to reach via telephone  Left voicemail for the patient to return phone call to the office if still wanting to perform tobacco cessation visit

## 2022-04-25 ENCOUNTER — OFFICE VISIT (OUTPATIENT)
Dept: FAMILY MEDICINE CLINIC | Facility: CLINIC | Age: 47
End: 2022-04-25

## 2022-04-25 VITALS
SYSTOLIC BLOOD PRESSURE: 112 MMHG | BODY MASS INDEX: 29.51 KG/M2 | HEIGHT: 66 IN | WEIGHT: 183.6 LBS | HEART RATE: 103 BPM | OXYGEN SATURATION: 95 % | TEMPERATURE: 96.9 F | RESPIRATION RATE: 20 BRPM | DIASTOLIC BLOOD PRESSURE: 80 MMHG

## 2022-04-25 DIAGNOSIS — Z13.6 SCREENING FOR HEART DISEASE: ICD-10-CM

## 2022-04-25 DIAGNOSIS — R09.89 CHEST CONGESTION: ICD-10-CM

## 2022-04-25 DIAGNOSIS — S02.5XXG OPEN FRACTURE OF TOOTH WITH DELAYED HEALING, SUBSEQUENT ENCOUNTER: ICD-10-CM

## 2022-04-25 DIAGNOSIS — J45.901 MODERATE ASTHMA WITH EXACERBATION, UNSPECIFIED WHETHER PERSISTENT: Primary | ICD-10-CM

## 2022-04-25 PROCEDURE — 99214 OFFICE O/P EST MOD 30 MIN: CPT | Performed by: NURSE PRACTITIONER

## 2022-04-25 RX ORDER — IBUPROFEN 800 MG/1
TABLET ORAL
COMMUNITY
Start: 2022-03-02

## 2022-04-25 RX ORDER — GUAIFENESIN 600 MG
1200 TABLET, EXTENDED RELEASE 12 HR ORAL EVERY 12 HOURS SCHEDULED
Qty: 30 TABLET | Refills: 1 | Status: SHIPPED | OUTPATIENT
Start: 2022-04-25

## 2022-04-25 NOTE — PROGRESS NOTES
Assessment/Plan:    Problem List Items Addressed This Visit        Digestive    Open fracture of tooth     No SOI on exam  Needs extraction - refer to OMS  Relevant Orders    Ambulatory Referral to Oral Maxillofacial Surgery       Respiratory    Moderate asthma with exacerbation - Primary     Much improved after starting advair and nebulizer last month  95% on RA  I still encourage him to quit smoking and he remains precontemplative  Cont prn albuterol neb and advair, recheck asthma in 6 mo or sooner if needed  Other Visit Diagnoses     Chest congestion        Relevant Medications    guaiFENesin (MUCINEX) 600 mg 12 hr tablet    Screening for heart disease        Relevant Orders    Lipid panel            Return in about 6 months (around 10/25/2022) for Annual physical     A chart review was performed and previous primary care visit notes were reviewed  All applicable imaging studies were reviewed and images were reviewed personally  All applicable laboratory studies were reviewed personally  Care everywhere review was performed if  available and all pertinent notes were reviewed  Subjective:     HPI: Felix Edward is a 56 y o  male who  has a past medical history of Bipolar 1 disorder ([facility] ), Myalgia, Schizophrenia ([facility] ), Seizures ([facility] ), Subdural hematoma ([facility] ), and Traumatic brain injury ([facility] )  who presented to the office today for asthma f/u  He was seen last month to establish care after multiple ED visits for uncontrolled asthma, asthma exacerbations  At that appt he was given neb due to dyspnea and saturations of 90-93 in office  He improved, I stepped up outpatient treatment to advair, provided nebulizer as duo-neb helped in ED visits more than albuterol pump  He states both have helped considerably and his asthma is now well controlled  He would like something for chest congestion, denies any viral URI symptoms  Also needs referral for fractured, painful tooth      The following portions of the patient's history were reviewed and updated as appropriate: allergies, current medications, past family history, past medical history, past social history, past surgical history, and problem list     Current Outpatient Medications on File Prior to Visit   Medication Sig Dispense Refill    clonazePAM (KlonoPIN) 0 5 mg tablet Take 0 5 mg by mouth daily at bedtime  0    escitalopram (LEXAPRO) 20 mg tablet every 24 hours      acetaminophen (TYLENOL) 325 mg tablet Take 3 tablets (975 mg total) by mouth every 8 (eight) hours (Patient not taking: Reported on 3/19/2020) 30 tablet 0    Albuterol Sulfate (ProAir RespiClick) 888 (90 Base) MCG/ACT AEPB Inhale 180 mcg every 4 (four) hours      fluticasone-salmeterol (Advair Diskus) 100-50 mcg/dose inhaler Inhale 1 puff 2 (two) times a day Rinse mouth after use  180 blister 3    ibuprofen (MOTRIN) 800 mg tablet TAKE 1 TABLET MY MOUTH EVERY 6 HOURS      ipratropium-albuterol (DUO-NEB) 0 5-2 5 mg/3 mL nebulizer solution Take 3 mL by nebulization 4 (four) times a day 30 mL 1    Multiple Vitamin (DAILY-VICKY) TABS Take 1 tablet by mouth every morning  1    phenytoin extended (DILANTIN) 300 MG ER capsule Take 300 mg by mouth 2 (two) times a day      QUEtiapine (SEROquel) 100 mg tablet Take 100 mg by mouth daily at bedtime  0     No current facility-administered medications on file prior to visit  Review of Systems   Constitutional: Negative  HENT: Positive for dental problem  Eyes: Negative  Respiratory: Positive for cough (congestion)  Cardiovascular: Negative  Gastrointestinal: Negative  Genitourinary: Negative  Musculoskeletal: Negative  Neurological: Negative  Psychiatric/Behavioral: Negative                Objective:    /80 (BP Location: Right arm, Patient Position: Sitting, Cuff Size: Standard)   Pulse 103   Temp (!) 96 9 °F (36 1 °C) (Temporal)   Resp 20   Ht 5' 6" (1 676 m)   Wt 83 3 kg (183 lb 9 6 oz)   SpO2 95%   BMI 29 63 kg/m²     Physical Exam  Constitutional:       Appearance: Normal appearance  He is normal weight  HENT:      Head: Normocephalic  Right Ear: External ear normal       Left Ear: External ear normal       Nose: Nose normal       Mouth/Throat:      Mouth: Mucous membranes are moist       Dentition: No gingival swelling or dental abscesses  Eyes:      Extraocular Movements: Extraocular movements intact  Conjunctiva/sclera: Conjunctivae normal    Cardiovascular:      Rate and Rhythm: Normal rate and regular rhythm  Pulses: Normal pulses  Heart sounds: Normal heart sounds  Pulmonary:      Effort: Pulmonary effort is normal       Breath sounds: Normal breath sounds  Musculoskeletal:         General: No tenderness or signs of injury  Normal range of motion  Cervical back: Normal range of motion  Right lower leg: No edema  Left lower leg: No edema  Skin:     General: Skin is warm and dry  Capillary Refill: Capillary refill takes less than 2 seconds  Findings: No bruising, lesion or rash  Neurological:      General: No focal deficit present  Mental Status: He is alert and oriented to person, place, and time  Psychiatric:         Mood and Affect: Mood normal          Behavior: Behavior normal          Thought Content: Thought content normal          BHARATH Barron  04/26/22  2:28 PM    There are no Patient Instructions on file for this visit

## 2022-04-26 PROBLEM — S02.5XXB OPEN FRACTURE OF TOOTH: Status: ACTIVE | Noted: 2022-04-26

## 2022-04-26 NOTE — ASSESSMENT & PLAN NOTE
Much improved after starting advair and nebulizer last month  95% on RA  I still encourage him to quit smoking and he remains precontemplative  Cont prn albuterol neb and advair, recheck asthma in 6 mo or sooner if needed

## 2022-05-02 DIAGNOSIS — J45.901 MODERATE ASTHMA WITH EXACERBATION, UNSPECIFIED WHETHER PERSISTENT: ICD-10-CM

## 2022-05-03 RX ORDER — IPRATROPIUM BROMIDE AND ALBUTEROL SULFATE 2.5; .5 MG/3ML; MG/3ML
SOLUTION RESPIRATORY (INHALATION)
Qty: 90 ML | Refills: 1 | Status: SHIPPED | OUTPATIENT
Start: 2022-05-03

## 2022-10-28 ENCOUNTER — OFFICE VISIT (OUTPATIENT)
Dept: FAMILY MEDICINE CLINIC | Facility: CLINIC | Age: 47
End: 2022-10-28

## 2022-10-28 VITALS
DIASTOLIC BLOOD PRESSURE: 86 MMHG | WEIGHT: 149.5 LBS | HEART RATE: 121 BPM | RESPIRATION RATE: 18 BRPM | BODY MASS INDEX: 24.03 KG/M2 | TEMPERATURE: 98 F | HEIGHT: 66 IN | SYSTOLIC BLOOD PRESSURE: 116 MMHG | OXYGEN SATURATION: 98 %

## 2022-10-28 DIAGNOSIS — R56.9 SEIZURE (HCC): ICD-10-CM

## 2022-10-28 DIAGNOSIS — R00.0 TACHYCARDIA: ICD-10-CM

## 2022-10-28 DIAGNOSIS — N18.9 CHRONIC KIDNEY DISEASE, UNSPECIFIED CKD STAGE: ICD-10-CM

## 2022-10-28 DIAGNOSIS — J45.901 MODERATE ASTHMA WITH EXACERBATION, UNSPECIFIED WHETHER PERSISTENT: ICD-10-CM

## 2022-10-28 DIAGNOSIS — F31.5 BIPOLAR DISORD, CRNT EPSD DEPRESS, SEVERE, W PSYCH FEATURES (HCC): ICD-10-CM

## 2022-10-28 DIAGNOSIS — F19.10 POLYSUBSTANCE ABUSE (HCC): ICD-10-CM

## 2022-10-28 DIAGNOSIS — S02.5XXG OPEN FRACTURE OF TOOTH WITH DELAYED HEALING, SUBSEQUENT ENCOUNTER: Primary | ICD-10-CM

## 2022-10-28 PROBLEM — M25.512 ACUTE PAIN OF LEFT SHOULDER: Status: RESOLVED | Noted: 2018-03-13 | Resolved: 2022-10-28

## 2022-10-28 RX ORDER — PRAZOSIN HYDROCHLORIDE 2 MG/1
2 CAPSULE ORAL 2 TIMES DAILY
COMMUNITY
Start: 2022-09-09

## 2022-10-28 NOTE — PATIENT INSTRUCTIONS
Asthma   WHAT YOU NEED TO KNOW:   Asthma is a lung disease that makes breathing difficult  Chronic inflammation and reactions to triggers narrow the airways in the lungs  Asthma can become life-threatening if it is not managed  DISCHARGE INSTRUCTIONS:   Call your local emergency number (911 in the [address]) if:   You have severe shortness of breath  The skin around your neck and ribs pulls in with each breath  Your peak flow numbers are in the red zone of your AAP  Return to the emergency department if:   You have shortness of breath, even after you take your short-term medicine as directed  Your lips or nails turn blue or gray  Call your doctor or asthma specialist if:   You run out of medicine before your next refill is due  Your symptoms get worse  You need to take more medicine than usual to control your symptoms  You have questions or concerns about your condition or care  Medicines:   Medicines  may be used to decrease inflammation, open airways, and make it easier to breathe  Medicines may be inhaled, taken as a pill, or injected  Short-term medicines relieve your symptoms quickly  Long-term medicines are used to prevent future asthma attacks  Other medicines may be needed if your regular medicines are not able to prevent attacks  You may also need medicine to help control your allergies  Ask your healthcare provider for more information about any medicine you are given and how to take it safely  Take your medicine as directed  Contact your healthcare provider if you think your medicine is not helping or if you have side effects  Tell him of her if you are allergic to any medicine  Keep a list of the medicines, vitamins, and herbs you take  Include the amounts, and when and why you take them  Bring the list or the pill bottles to follow-up visits  Carry your medicine list with you in case of an emergency  Manage your symptoms and prevent future attacks:    Follow your Asthma Action Plan (AAP)  This is a written plan that you and your healthcare provider create  It explains which medicine you need and when to change doses if necessary  It also explains how you can monitor symptoms and use a peak flow meter  The meter measures how well your lungs are working  Manage other health conditions , such as allergies, acid reflux, and sleep apnea  Identify and avoid triggers  These may include pets, dust mites, mold, and cockroaches  Do not smoke or be around others who smoke  Nicotine and other chemicals in cigarettes and cigars can cause lung damage  Ask your healthcare provider for information if you currently smoke and need help to quit  E-cigarettes or smokeless tobacco still contain nicotine  Talk to your healthcare provider before you use these products  Ask about the flu vaccine  The flu can make your asthma worse  You may need a yearly flu shot  Follow up with your healthcare provider as directed: You will need to return to make sure your medicine is working and your symptoms are controlled  You may be referred to an asthma or allergy specialist  Sarmad Skinner may be asked to keep a record of your peak flow values and bring it with you to your appointments  Write down your questions so you remember to ask them during your visits  © Copyright [employer] 2022 Information is for End User's use only and may not be sold, redistributed or otherwise used for commercial purposes  All illustrations and images included in CareNotes® are the copyrighted property of [employer]  or Alaina Lloyd  The above information is an  only  It is not intended as medical advice for individual conditions or treatments  Talk to your doctor, nurse or pharmacist before following any medical regimen to see if it is safe and effective for you

## 2022-10-28 NOTE — PROGRESS NOTES
Assessment/Plan:    Open fracture of tooth  Chronic issue, previously referred to [facility] but has not yet gone   He would like to be referred again to have remaining tooth extracted, referral placed   No sx/sx infection on exam     Moderate asthma with exacerbation  Patient states that he has not been using advair inhaler, encouraged daily use to prevent exacerbations and minimize albuterol use  Patient states that he is only smoking 1 cigarette per day now  Seizure [facility])  Patient denies any seizure activity  He states that he is not taking dilantin any longer  He states he was having more seizures when he was on the medication  [facility]ub[facility] abuse ([facility] )  Denies drug or alcohol use     Bipolar disord, crnt epsd depress, severe, w psych features ([facility] )  Follows with psychiatry   Regimen: Seroquel, Minipress, Clonopin, Lexapro     Diagnoses and all orders for this visit:    Open fracture of tooth with delayed healing, subsequent encounter  -     Ambulatory Referral to Oral Maxillofacial Surgery; Future    Moderate asthma with exacerbation, unspecified whether persistent    Seizure ([facility] )  -     CBC and differential; Future  -     Comprehensive metabolic panel; Future  -     Hemoglobin A1C; Future  -     Lipid panel; Future  -     TSH, 3rd generation with Free T4 reflex; Future    Chronic kidney disease, unspecified CKD stage  -     CBC and differential; Future  -     Comprehensive metabolic panel; Future  -     Hemoglobin A1C; Future  -     Lipid panel; Future  -     TSH, 3rd generation with Free T4 reflex; Future    Tachycardia  -     CBC and differential; Future  -     Comprehensive metabolic panel; Future  -     Hemoglobin A1C; Future  -     Lipid panel; Future  -     TSH, 3rd generation with Free T4 reflex;  Future    Polysubstance abuse ([facility] )    Bipolar disord, crnt epsd depress, severe, w psych features [facility])        Return in about 1 year (around 10/28/2023) for Annual physical       Patient Instructions Asthma   WHAT YOU NEED TO KNOW:   Asthma is a lung disease that makes breathing difficult  Chronic inflammation and reactions to triggers narrow the airways in the lungs  Asthma can become life-threatening if it is not managed  DISCHARGE INSTRUCTIONS:   Call your local emergency number (911 in the [address]) if:   · You have severe shortness of breath  · The skin around your neck and ribs pulls in with each breath  · Your peak flow numbers are in the red zone of your AAP  Return to the emergency department if:   · You have shortness of breath, even after you take your short-term medicine as directed  · Your lips or nails turn blue or gray  Call your doctor or asthma specialist if:   · You run out of medicine before your next refill is due  · Your symptoms get worse  · You need to take more medicine than usual to control your symptoms  · You have questions or concerns about your condition or care  Medicines:   · Medicines  may be used to decrease inflammation, open airways, and make it easier to breathe  Medicines may be inhaled, taken as a pill, or injected  Short-term medicines relieve your symptoms quickly  Long-term medicines are used to prevent future asthma attacks  Other medicines may be needed if your regular medicines are not able to prevent attacks  You may also need medicine to help control your allergies  Ask your healthcare provider for more information about any medicine you are given and how to take it safely  · Take your medicine as directed  Contact your healthcare provider if you think your medicine is not helping or if you have side effects  Tell him of her if you are allergic to any medicine  Keep a list of the medicines, vitamins, and herbs you take  Include the amounts, and when and why you take them  Bring the list or the pill bottles to follow-up visits  Carry your medicine list with you in case of an emergency      Manage your symptoms and prevent future attacks:   · Follow your Asthma Action Plan (AAP)  This is a written plan that you and your healthcare provider create  It explains which medicine you need and when to change doses if necessary  It also explains how you can monitor symptoms and use a peak flow meter  The meter measures how well your lungs are working  · Manage other health conditions , such as allergies, acid reflux, and sleep apnea  · Identify and avoid triggers  These may include pets, dust mites, mold, and cockroaches  · Do not smoke or be around others who smoke  Nicotine and other chemicals in cigarettes and cigars can cause lung damage  Ask your healthcare provider for information if you currently smoke and need help to quit  E-cigarettes or smokeless tobacco still contain nicotine  Talk to your healthcare provider before you use these products  · Ask about the flu vaccine  The flu can make your asthma worse  You may need a yearly flu shot  Follow up with your healthcare provider as directed: You will need to return to make sure your medicine is working and your symptoms are controlled  You may be referred to an asthma or allergy specialist  Lon Maurers may be asked to keep a record of your peak flow values and bring it with you to your appointments  Write down your questions so you remember to ask them during your visits  © Copyright "[employer]" 2022 Information is for End User's use only and may not be sold, redistributed or otherwise used for commercial purposes  All illustrations and images included in CareNotes® are the copyrighted property of [employer] Inc  or Alaina Jha   The above information is an  only  It is not intended as medical advice for individual conditions or treatments  Talk to your doctor, nurse or pharmacist before following any medical regimen to see if it is safe and effective for you            Subjective:     Nabil Christina is a 56 y o  male who  has a past medical history of Bipolar 1 disorder ([facility] ), Myalgia, Schizophrenia ([facility] ), [facility]ures ([facility] ), Subdural hematoma, and Traumatic brain injury  who presented to the office today for follow up  The following portions of the patient's history were reviewed and updated as appropriate: allergies, current medications, past family history, past medical history, past social history, past surgical history and problem list     Current Outpatient Medications on File Prior to Visit   Medication Sig Dispense Refill   • clonazePAM (KlonoPIN) 0 5 mg tablet Take 0 5 mg by mouth daily at bedtime  0   • escitalopram (LEXAPRO) 20 mg tablet every 24 hours     • QUEtiapine (SEROquel) 100 mg tablet Take 100 mg by mouth daily at bedtime  0   • acetaminophen (TYLENOL) 325 mg tablet Take 3 tablets (975 mg total) by mouth every 8 (eight) hours (Patient not taking: Reported on 3/19/2020) 30 tablet 0   • Albuterol Sulfate (ProAir RespiClick) 235 (90 Base) MCG/ACT AEPB Inhale 180 mcg every 4 (four) hours     • fluticasone-salmeterol (Advair Diskus) 100-50 mcg/dose inhaler Inhale 1 puff 2 (two) times a day Rinse mouth after use  180 blister 3   • ipratropium-albuterol (DUO-NEB) 0 5-2 5 mg/3 mL nebulizer solution TAKE 3 ML BY NEBULIZATION 4 (FOUR) TIMES A DAY, STOCK SIZE 90ML 90 mL 1   • phenytoin extended (DILANTIN) 300 MG ER capsule Take 300 mg by mouth 2 (two) times a day     • prazosin (MINIPRESS) 2 mg capsule Take 2 mg by mouth 2 (two) times a day     • [DISCONTINUED] guaiFENesin (MUCINEX) 600 mg 12 hr tablet Take 2 tablets (1,200 mg total) by mouth every 12 (twelve) hours 30 tablet 1   • [DISCONTINUED] ibuprofen (MOTRIN) 800 mg tablet TAKE 1 TABLET MY MOUTH EVERY 6 HOURS     • [DISCONTINUED] Multiple Vitamin (DAILY-VICKY) TABS Take 1 tablet by mouth every morning  1     No current facility-administered medications on file prior to visit  Review of Systems   Constitutional: Negative for chills and fever  HENT: Positive for dental problem   Negative for ear pain and sore throat  Eyes: Negative for pain and visual disturbance  Respiratory: Negative for cough and shortness of breath  Cardiovascular: Negative for chest pain and palpitations  Gastrointestinal: Negative for abdominal pain and vomiting  Genitourinary: Negative for dysuria and hematuria  Musculoskeletal: Negative for arthralgias and back pain  Skin: Negative for color change and rash  Neurological: Negative for seizures and syncope  All other systems reviewed and are negative  Tobacco Cessation Counseling: Tobacco cessation counseling was provided  The patient is sincerely urged to quit consumption of tobacco  He is not ready to quit tobacco           Objective:    /86 (BP Location: Left arm, Patient Position: Sitting, Cuff Size: Standard)   Pulse (!) 121   Temp 98 °F (36 7 °C) (Temporal)   Resp 18   Ht 5' 6" (1 676 m)   Wt 67 8 kg (149 lb 8 oz)   SpO2 98%   BMI 24 13 kg/m²     Physical Exam  Vitals and nursing note reviewed  Constitutional:       General: He is not in acute distress  Appearance: He is not toxic-appearing  HENT:      Head: Normocephalic and atraumatic  Mouth/Throat:      Dentition: Abnormal dentition  Dental caries present  Comments: Bottom left tooth fracture  Eyes:      General:         Right eye: No discharge  Left eye: No discharge  Conjunctiva/sclera: Conjunctivae normal    Cardiovascular:      Rate and Rhythm: Normal rate and regular rhythm  Pulmonary:      Effort: Pulmonary effort is normal  No respiratory distress  Breath sounds: Normal breath sounds  Musculoskeletal:      Cervical back: Normal range of motion  Lymphadenopathy:      Cervical: No cervical adenopathy  Neurological:      General: No focal deficit present  Mental Status: He is alert  Psychiatric:         Mood and Affect: Mood is anxious           Rekha Engle  10/28/22  1:34 PM

## 2022-10-28 NOTE — ASSESSMENT & PLAN NOTE
Patient denies any seizure activity  He states that he is not taking dilantin any longer  He states he was having more seizures when he was on the medication

## 2022-10-28 NOTE — ASSESSMENT & PLAN NOTE
Patient states that he has not been using advair inhaler, encouraged daily use to prevent exacerbations and minimize albuterol use  Patient states that he is only smoking 1 cigarette per day now

## 2022-12-28 ENCOUNTER — VBI (OUTPATIENT)
Dept: ADMINISTRATIVE | Facility: OTHER | Age: 47
End: 2022-12-28

## 2023-09-21 ENCOUNTER — VBI (OUTPATIENT)
Dept: ADMINISTRATIVE | Facility: OTHER | Age: 48
End: 2023-09-21

## 2023-11-23 ENCOUNTER — HOSPITAL ENCOUNTER (EMERGENCY)
Facility: HOSPITAL | Age: 48
Discharge: HOME | End: 2023-11-23
Attending: EMERGENCY MEDICINE
Payer: COMMERCIAL

## 2023-11-23 VITALS
SYSTOLIC BLOOD PRESSURE: 148 MMHG | DIASTOLIC BLOOD PRESSURE: 85 MMHG | HEART RATE: 92 BPM | TEMPERATURE: 98.7 F | WEIGHT: 167.33 LBS | OXYGEN SATURATION: 92 % | BODY MASS INDEX: 27.01 KG/M2 | RESPIRATION RATE: 22 BRPM

## 2023-11-23 DIAGNOSIS — J45.901 ASTHMA WITH ACUTE EXACERBATION: Primary | ICD-10-CM

## 2023-11-23 DIAGNOSIS — J45.901 MODERATE ASTHMA WITH EXACERBATION, UNSPECIFIED WHETHER PERSISTENT: ICD-10-CM

## 2023-11-23 PROCEDURE — 94640 AIRWAY INHALATION TREATMENT: CPT

## 2023-11-23 PROCEDURE — 99284 EMERGENCY DEPT VISIT MOD MDM: CPT

## 2023-11-23 PROCEDURE — 99284 EMERGENCY DEPT VISIT MOD MDM: CPT | Performed by: EMERGENCY MEDICINE

## 2023-11-23 RX ORDER — PREDNISONE 20 MG/1
60 TABLET ORAL ONCE
Status: COMPLETED | OUTPATIENT
Start: 2023-11-23 | End: 2023-11-23

## 2023-11-23 RX ORDER — ALBUTEROL SULFATE 2.5 MG/3ML
2.5 SOLUTION RESPIRATORY (INHALATION) EVERY 4 HOURS PRN
Qty: 75 ML | Refills: 0 | Status: SHIPPED | OUTPATIENT
Start: 2023-11-23 | End: 2023-12-07 | Stop reason: SDUPTHER

## 2023-11-23 RX ORDER — ALBUTEROL SULFATE 2.5 MG/3ML
2.5 SOLUTION RESPIRATORY (INHALATION) ONCE
Status: COMPLETED | OUTPATIENT
Start: 2023-11-23 | End: 2023-11-23

## 2023-11-23 RX ORDER — PREDNISONE 20 MG/1
60 TABLET ORAL DAILY
Qty: 12 TABLET | Refills: 0 | Status: SHIPPED | OUTPATIENT
Start: 2023-11-24 | End: 2023-11-28

## 2023-11-23 RX ADMIN — ALBUTEROL SULFATE 2.5 MG: 2.5 SOLUTION RESPIRATORY (INHALATION) at 07:27

## 2023-11-23 RX ADMIN — PREDNISONE 60 MG: 20 TABLET ORAL at 07:25

## 2023-11-23 RX ADMIN — IPRATROPIUM BROMIDE 0.5 MG: 0.5 SOLUTION RESPIRATORY (INHALATION) at 07:27

## 2023-11-23 NOTE — ED PROVIDER NOTES
History  Chief Complaint   Patient presents with    Shortness of Breath     SOB since last night ran out of neb tx. Has been using his albuterol inhaler frequently. 71-year-old male, history of asthma, presents with shortness of breath. Patient states he has felt more short of breath over the past 2 days. Ran out of his nebulizer medication, has been using albuterol 1 inhaler with minimal relief. Patient states he still smokes. Denies any fevers. History provided by:  Patient   used: No    Shortness of Breath  Associated symptoms: no fever        Prior to Admission Medications   Prescriptions Last Dose Informant Patient Reported? Taking? Albuterol Sulfate (ProAir RespiClick) 140 (90 Base) MCG/ACT AEPB 11/23/2023  Yes Yes   Sig: Inhale 180 mcg every 4 (four) hours   QUEtiapine (SEROquel) 100 mg tablet Past Week  Yes Yes   Sig: Take 100 mg by mouth daily at bedtime   acetaminophen (TYLENOL) 325 mg tablet Not Taking  No No   Sig: Take 3 tablets (975 mg total) by mouth every 8 (eight) hours   Patient not taking: Reported on 3/19/2020   clonazePAM (KlonoPIN) 0.5 mg tablet Past Week  Yes Yes   Sig: Take 0.5 mg by mouth daily at bedtime   escitalopram (LEXAPRO) 20 mg tablet Past Week  Yes Yes   Sig: every 24 hours   fluticasone-salmeterol (Advair Diskus) 100-50 mcg/dose inhaler   No No   Sig: Inhale 1 puff 2 (two) times a day Rinse mouth after use.    ipratropium-albuterol (DUO-NEB) 0.5-2.5 mg/3 mL nebulizer solution Past Week  No Yes   Sig: TAKE 3 ML BY NEBULIZATION 4 (FOUR) TIMES A DAY, STOCK SIZE 90ML   phenytoin extended (DILANTIN) 300 MG ER capsule Not Taking  Yes No   Sig: Take 300 mg by mouth 2 (two) times a day   Patient not taking: Reported on 11/23/2023   prazosin (MINIPRESS) 2 mg capsule Past Week  Yes Yes   Sig: Take 2 mg by mouth 2 (two) times a day      Facility-Administered Medications: None       Past Medical History:   Diagnosis Date    Bipolar 1 disorder ([address] [facility]) Myalgia 4/17/2019    Schizophrenia ([address])     Seizures ([address])     Subdural hematoma ([address]) 3/26/2019    Traumatic brain injury ([address]) 3/27/2019       Past Surgical History:   Procedure Laterality Date    ROTATOR CUFF REPAIR         History reviewed. No pertinent family history. I have reviewed and agree with the history as documented. E-Cigarette/Vaping    E-Cigarette Use Never User      E-Cigarette/Vaping Substances     Social History     Tobacco Use    Smoking status: Every Day     Packs/day: 0.25     Types: Cigarettes    Smokeless tobacco: Current   Vaping Use    Vaping Use: Never used   Substance Use Topics    Alcohol use: Not Currently    Drug use: Yes     Types: Marijuana       Review of Systems   Constitutional: Negative. Negative for fever. Respiratory:  Positive for shortness of breath. Cardiovascular: Negative. Neurological: Negative. Physical Exam  Physical Exam  Vitals and nursing note reviewed. Constitutional:       General: He is not in acute distress. HENT:      Head: Normocephalic and atraumatic. Mouth/Throat:      Mouth: Mucous membranes are moist.      Pharynx: Oropharynx is clear. Eyes:      Extraocular Movements: Extraocular movements intact. Pupils: Pupils are equal, round, and reactive to light. Cardiovascular:      Rate and Rhythm: Normal rate and regular rhythm. Pulmonary:      Comments: Mild tachypnea, speaking full sentences  Diffuse expiratory wheezing  Chest:      Chest wall: No tenderness. Musculoskeletal:         General: Normal range of motion. Skin:     General: Skin is warm and dry. Neurological:      General: No focal deficit present. Mental Status: He is alert and oriented to person, place, and time.       Gait: Gait normal.         Vital Signs  ED Triage Vitals [11/23/23 0706]   Temperature Pulse Respirations Blood Pressure SpO2   98.7 °F (37.1 °C) 92 22 148/85 92 %      Temp Source Heart Rate Source Patient Position - Orthostatic VS BP Location FiO2 (%)   Tympanic Monitor Sitting Left arm --      Pain Score       --           Vitals:    11/23/23 0706   BP: 148/85   Pulse: 92   Patient Position - Orthostatic VS: Sitting         Visual Acuity      ED Medications  Medications   albuterol inhalation solution 2.5 mg (2.5 mg Nebulization Given 11/23/23 0727)   ipratropium (ATROVENT) 0.02 % inhalation solution 0.5 mg (0.5 mg Nebulization Given 11/23/23 0727)   predniSONE tablet 60 mg (60 mg Oral Given 11/23/23 0725)       Diagnostic Studies  Results Reviewed       None                   No orders to display              Procedures  Procedures         ED Course  ED Course as of 11/23/23 0810   Thu Nov 23, 2023   0810 Patient reports feeling better after receiving medications. Appears comfortable with normal respiratory effort. On repeat exam, good air movement, mild end expiratory wheezing. Patient states he is feeling better and is requesting to be discharged. Needs refill of albuterol for nebulizer at home, will also place on 4 more days of oral prednisone. Discussed with patient follow-up with primary doctor for repeat evaluation, return to emergency department for any worsening or new concerning symptoms. SBIRT 22yo+      Flowsheet Row Most Recent Value   Initial Alcohol Screen: US AUDIT-C     1. How often do you have a drink containing alcohol? 0 Filed at: 11/23/2023 0709   2. How many drinks containing alcohol do you have on a typical day you are drinking? 0 Filed at: 11/23/2023 0709   3a. Male UNDER 65: How often do you have five or more drinks on one occasion? 0 Filed at: 11/23/2023 0709   Audit-C Score 0 Filed at: 11/23/2023 4101   RADHA: How many times in the past year have you. .. Used an illegal drug or used a prescription medication for non-medical reasons? Daily or Almost Daily Filed at: 11/23/2023 3971   DAST-10: In the past 12 months. ..    1. Have you used drugs other than those required for medical reasons? 1 Filed at: 11/23/2023 0709   2. Do you use more than one drug at a time? 0 Filed at: 11/23/2023 0709   3. Have you had medical problems as a result of your drug use (e.g., memory loss, hepatitis, convulsions, bleeding, etc.)? 0 Filed at: 11/23/2023 0709   4. Have you had "blackouts" or "flashbacks" as a result of drug use? YesNo 0 Filed at: 11/23/2023 0709   5. Do you ever feel bad or guilty about your drug use? 0 Filed at: 11/23/2023 0709   6. Does your spouse (or parent) ever complain about your involvement with drugs? 0 Filed at: 11/23/2023 0709   7. Have you neglected your family because of your use of drugs? 0 Filed at: 11/23/2023 0709   8. Have you engaged in illegal activities in order to obtain drugs? 0 Filed at: 11/23/2023 0709   9. Have you ever experienced withdrawal symptoms (felt sick) when you stopped taking drugs? 1 Filed at: 11/23/2023 0709   10. Are you always able to stop using drugs when you want to? 1 Filed at: 11/23/2023 5098   DAST-10 Score 3 Filed at: 11/23/2023 3268                      Medical Decision Making  60-year-old male, history of asthma, presenting with shortness of breath. Differential diagnosis includes acute asthma exacerbation, pneumonia, pulmonary edema among other diagnoses. Patient with diffuse expiratory wheezing on exam, reports symptoms similar to previous asthma exacerbations. Will give nebulizer treatment with ipratropium and albuterol as well as prednisone, continue to monitor in ED and reevaluate. Amount and/or Complexity of Data Reviewed  External Data Reviewed: notes. Details: Previous medical records reviewed, multiple ED visits for asthma exacerbation. Risk  Prescription drug management.              Disposition  Final diagnoses:   Asthma with acute exacerbation     Time reflects when diagnosis was documented in both MDM as applicable and the Disposition within this note       Time User Action Codes Description Comment    11/23/2023  8:06 AM Sulema Mccall Add [J43.984] Asthma with acute exacerbation     11/23/2023  8:06 AM Sulema Mccall Add [J45.901] Moderate asthma with exacerbation, unspecified whether persistent           ED Disposition       ED Disposition   Discharge    Condition   Stable    Date/Time   Thu Nov 23, 2023 1420 [facility] discharge to home/self care.                    Follow-up Information       Follow up With Specialties Details Why [address], [facility], Nurse Practitioner   [address]  [address]  [phone]              Discharge Medication List as of 11/23/2023  8:08 AM        START taking these medications    Details   albuterol (2.5 mg/3 mL) 0.083 % nebulizer solution Take 3 mL (2.5 mg total) by nebulization every 4 (four) hours as needed for wheezing or shortness of breath, Starting Thu 11/23/2023, Normal      predniSONE 20 mg tablet Take 3 tablets (60 mg total) by mouth daily for 4 days Do not start before November 24, 2023., Starting Fri 11/24/2023, Until Tue 11/28/2023, Normal           CONTINUE these medications which have NOT CHANGED    Details   Albuterol Sulfate (ProAir RespiClick) 409 (90 Base) MCG/ACT AEPB Inhale 180 mcg every 4 (four) hours, Starting Mon 2/21/2022, Historical Med      clonazePAM (KlonoPIN) 0.5 mg tablet Take 0.5 mg by mouth daily at bedtime, Starting Sun 12/23/2018, Historical Med      escitalopram (LEXAPRO) 20 mg tablet every 24 hours, Historical Med      ipratropium-albuterol (DUO-NEB) 0.5-2.5 mg/3 mL nebulizer solution TAKE 3 ML BY NEBULIZATION 4 (FOUR) TIMES A DAY, STOCK SIZE 90ML, Normal      prazosin (MINIPRESS) 2 mg capsule Take 2 mg by mouth 2 (two) times a day, Starting Fri 9/9/2022, Historical Med      QUEtiapine (SEROquel) 100 mg tablet Take 100 mg by mouth daily at bedtime, Starting Sun 12/23/2018, Historical Med      acetaminophen (TYLENOL) 325 mg tablet Take 3 tablets (975 mg total) by mouth every 8 (eight) hours, Starting Mon 4/1/2019, No Print      fluticasone-salmeterol (Advair Diskus) 100-50 mcg/dose inhaler Inhale 1 puff 2 (two) times a day Rinse mouth after use., Starting Wed 3/30/2022, Until Sat 3/25/2023, Normal      phenytoin extended (DILANTIN) 300 MG ER capsule Take 300 mg by mouth 2 (two) times a day, Historical Med             No discharge procedures on file.     PDMP Review       None            ED Provider  Electronically Signed by             Osei Sweeney MD  11/23/23 5739

## 2023-12-07 ENCOUNTER — HOSPITAL ENCOUNTER (EMERGENCY)
Facility: HOSPITAL | Age: 48
Discharge: HOME | End: 2023-12-07
Attending: EMERGENCY MEDICINE
Payer: COMMERCIAL

## 2023-12-07 ENCOUNTER — APPOINTMENT (EMERGENCY)
Dept: RADIOLOGY | Facility: HOSPITAL | Age: 48
End: 2023-12-07
Payer: COMMERCIAL

## 2023-12-07 VITALS
RESPIRATION RATE: 22 BRPM | DIASTOLIC BLOOD PRESSURE: 75 MMHG | SYSTOLIC BLOOD PRESSURE: 140 MMHG | HEART RATE: 109 BPM | TEMPERATURE: 98.9 F | WEIGHT: 166.7 LBS | BODY MASS INDEX: 26.91 KG/M2 | OXYGEN SATURATION: 99 %

## 2023-12-07 DIAGNOSIS — J45.901 ASTHMA WITH ACUTE EXACERBATION: ICD-10-CM

## 2023-12-07 DIAGNOSIS — J45.901 ASTHMA EXACERBATION: Primary | ICD-10-CM

## 2023-12-07 LAB
ALBUMIN SERPL BCP-MCNC: 4.1 G/DL (ref 3.5–5)
ALP SERPL-CCNC: 114 U/L (ref 34–104)
ALT SERPL W P-5'-P-CCNC: 20 U/L (ref 7–52)
ANION GAP SERPL CALCULATED.3IONS-SCNC: 9 MMOL/L
APTT PPP: 35 SECONDS (ref 23–37)
AST SERPL W P-5'-P-CCNC: 23 U/L (ref 13–39)
BASOPHILS # BLD MANUAL: 0.11 THOUSAND/UL (ref 0–0.1)
BASOPHILS NFR MAR MANUAL: 1 % (ref 0–1)
BILIRUB SERPL-MCNC: 0.32 MG/DL (ref 0.2–1)
BUN SERPL-MCNC: 13 MG/DL (ref 5–25)
CALCIUM SERPL-MCNC: 9.4 MG/DL (ref 8.4–10.2)
CHLORIDE SERPL-SCNC: 103 MMOL/L (ref 96–108)
CO2 SERPL-SCNC: 28 MMOL/L (ref 21–32)
CREAT SERPL-MCNC: 1.1 MG/DL (ref 0.6–1.3)
EOSINOPHIL # BLD MANUAL: 1.66 THOUSAND/UL (ref 0–0.4)
EOSINOPHIL NFR BLD MANUAL: 15 % (ref 0–6)
ERYTHROCYTE [DISTWIDTH] IN BLOOD BY AUTOMATED COUNT: 13.4 % (ref 11.6–15.1)
GFR SERPL CREATININE-BSD FRML MDRD: 79 ML/MIN/1.73SQ M
GIANT PLATELETS BLD QL SMEAR: PRESENT
GLUCOSE SERPL-MCNC: 148 MG/DL (ref 65–140)
HCT VFR BLD AUTO: 42.1 % (ref 36.5–49.3)
HGB BLD-MCNC: 13.5 G/DL (ref 12–17)
INR PPP: 1.12 (ref 0.84–1.19)
LACTATE SERPL-SCNC: 1.3 MMOL/L (ref 0.5–2)
LYMPHOCYTES # BLD AUTO: 2.21 THOUSAND/UL (ref 0.6–4.47)
LYMPHOCYTES # BLD AUTO: 20 % (ref 14–44)
MCH RBC QN AUTO: 26.7 PG (ref 26.8–34.3)
MCHC RBC AUTO-ENTMCNC: 32.1 G/DL (ref 31.4–37.4)
MCV RBC AUTO: 83 FL (ref 82–98)
MONOCYTES # BLD AUTO: 0.66 THOUSAND/UL (ref 0–1.22)
MONOCYTES NFR BLD: 6 % (ref 4–12)
NEUTROPHILS # BLD MANUAL: 6.41 THOUSAND/UL (ref 1.85–7.62)
NEUTS SEG NFR BLD AUTO: 58 % (ref 43–75)
PLATELET # BLD AUTO: 318 THOUSANDS/UL (ref 149–390)
PLATELET BLD QL SMEAR: ADEQUATE
PMV BLD AUTO: 10 FL (ref 8.9–12.7)
POTASSIUM SERPL-SCNC: 3.7 MMOL/L (ref 3.5–5.3)
PROCALCITONIN SERPL-MCNC: 0.07 NG/ML
PROT SERPL-MCNC: 6.9 G/DL (ref 6.4–8.4)
PROTHROMBIN TIME: 14.7 SECONDS (ref 11.6–14.5)
RBC # BLD AUTO: 5.05 MILLION/UL (ref 3.88–5.62)
RBC MORPH BLD: NORMAL
SODIUM SERPL-SCNC: 140 MMOL/L (ref 135–147)
WBC # BLD AUTO: 11.06 THOUSAND/UL (ref 4.31–10.16)

## 2023-12-07 PROCEDURE — 85730 THROMBOPLASTIN TIME PARTIAL: CPT | Performed by: EMERGENCY MEDICINE

## 2023-12-07 PROCEDURE — 96374 THER/PROPH/DIAG INJ IV PUSH: CPT

## 2023-12-07 PROCEDURE — 94664 DEMO&/EVAL PT USE INHALER: CPT

## 2023-12-07 PROCEDURE — 99285 EMERGENCY DEPT VISIT HI MDM: CPT | Performed by: EMERGENCY MEDICINE

## 2023-12-07 PROCEDURE — 84145 PROCALCITONIN (PCT): CPT | Performed by: EMERGENCY MEDICINE

## 2023-12-07 PROCEDURE — 94640 AIRWAY INHALATION TREATMENT: CPT

## 2023-12-07 PROCEDURE — 36415 COLL VENOUS BLD VENIPUNCTURE: CPT | Performed by: EMERGENCY MEDICINE

## 2023-12-07 PROCEDURE — 85610 PROTHROMBIN TIME: CPT | Performed by: EMERGENCY MEDICINE

## 2023-12-07 PROCEDURE — 80053 COMPREHEN METABOLIC PANEL: CPT | Performed by: EMERGENCY MEDICINE

## 2023-12-07 PROCEDURE — 99285 EMERGENCY DEPT VISIT HI MDM: CPT

## 2023-12-07 PROCEDURE — 94644 CONT INHLJ TX 1ST HOUR: CPT

## 2023-12-07 PROCEDURE — 71045 X-RAY EXAM CHEST 1 VIEW: CPT

## 2023-12-07 PROCEDURE — 85007 BL SMEAR W/DIFF WBC COUNT: CPT | Performed by: EMERGENCY MEDICINE

## 2023-12-07 PROCEDURE — 83605 ASSAY OF LACTIC ACID: CPT | Performed by: EMERGENCY MEDICINE

## 2023-12-07 PROCEDURE — 94760 N-INVAS EAR/PLS OXIMETRY 1: CPT

## 2023-12-07 PROCEDURE — 85027 COMPLETE CBC AUTOMATED: CPT | Performed by: EMERGENCY MEDICINE

## 2023-12-07 PROCEDURE — 87040 BLOOD CULTURE FOR BACTERIA: CPT | Performed by: EMERGENCY MEDICINE

## 2023-12-07 PROCEDURE — 93005 ELECTROCARDIOGRAM TRACING: CPT

## 2023-12-07 RX ORDER — METHYLPREDNISOLONE SODIUM SUCCINATE 125 MG/2ML
80 INJECTION, POWDER, LYOPHILIZED, FOR SOLUTION INTRAMUSCULAR; INTRAVENOUS ONCE
Status: COMPLETED | OUTPATIENT
Start: 2023-12-07 | End: 2023-12-07

## 2023-12-07 RX ORDER — AZITHROMYCIN 250 MG/1
TABLET, FILM COATED ORAL
Qty: 6 TABLET | Refills: 0 | Status: SHIPPED | OUTPATIENT
Start: 2023-12-07 | End: 2023-12-11

## 2023-12-07 RX ORDER — ALBUTEROL SULFATE 2.5 MG/3ML
2.5 SOLUTION RESPIRATORY (INHALATION) EVERY 4 HOURS PRN
Qty: 75 ML | Refills: 2 | Status: SHIPPED | OUTPATIENT
Start: 2023-12-07

## 2023-12-07 RX ORDER — SODIUM CHLORIDE FOR INHALATION 0.9 %
12 VIAL, NEBULIZER (ML) INHALATION ONCE
Status: COMPLETED | OUTPATIENT
Start: 2023-12-07 | End: 2023-12-07

## 2023-12-07 RX ADMIN — ALBUTEROL SULFATE 10 MG: 2.5 SOLUTION RESPIRATORY (INHALATION) at 20:04

## 2023-12-07 RX ADMIN — Medication 12 ML: at 20:05

## 2023-12-07 RX ADMIN — METHYLPREDNISOLONE SODIUM SUCCINATE 80 MG: 125 INJECTION, POWDER, FOR SOLUTION INTRAMUSCULAR; INTRAVENOUS at 19:59

## 2023-12-07 RX ADMIN — IPRATROPIUM BROMIDE 1 MG: 0.5 SOLUTION RESPIRATORY (INHALATION) at 20:04

## 2023-12-08 LAB
ATRIAL RATE: 120 BPM
P AXIS: 74 DEGREES
PR INTERVAL: 138 MS
QRS AXIS: 62 DEGREES
QRSD INTERVAL: 86 MS
QT INTERVAL: 336 MS
QTC INTERVAL: 474 MS
T WAVE AXIS: 54 DEGREES
VENTRICULAR RATE: 120 BPM

## 2023-12-08 NOTE — SEPSIS NOTE
Sepsis Note   Rosana Stevens 53 y.o. male MRN: [id number]  Unit/Bed#: ED 03 Encounter: [id number]       Initial Sepsis Screening       Row Name 12/07/23 2027                Is the patient's history suggestive of a new or worsening infection? Yes (Proceed)  -[address] [place]        Suspected source of infection pneumonia  -[facility]        Indicate SIRS criteria Tachycardia > 90 bpm;Tachypnea > 20 resp per min  -[facility]        Are two or more of the above signs & symptoms of infection both present and new to the patient? Yes (Proceed)  -[facility]        Assess for evidence of organ dysfunction: Are any of the below criteria present within 6 hours of suspected infection and SIRS criteria that are NOT considered to be chronic conditions? --                  User Key  (r) = Recorded By, (t) = Taken By, (c) = Cosigned By      [address] [place] [address] Name Provider Type    Jesus Manuel Pryor MD Physician                        Body mass index is 26.91 kg/m². Wt Readings from Last 1 Encounters:   12/07/23 75.6 kg (166 lb 11.2 oz)        Ideal body weight: 63.8 kg (140 lb 10.5 oz)  Adjusted ideal body weight: 68.5 kg (151 lb 1.2 oz)    Lactic 1.3  SIRS criteria from asthma issues. Sepsis screening stopped.

## 2023-12-08 NOTE — ED PROCEDURE NOTE
PROCEDURE  ECG 12 Lead Documentation Only    Date/Time: 12/7/2023 7:39 PM    Performed by: Yudy Jordan MD  Authorized by: Yudy Jordan MD    Indications / Diagnosis:  Dyspnea  Patient location:  ED  Interpretation:     Interpretation: normal    Rate:     ECG rate:  120    ECG rate assessment: tachycardic    Rhythm:     Rhythm: sinus tachycardia    Ectopy:     Ectopy: none    QRS:     QRS axis:  Normal    QRS intervals:  Normal  Conduction:     Conduction: normal    ST segments:     ST segments:  Normal  T waves:     T waves: normal         Yudy Jordan MD  12/07/23 1950

## 2023-12-08 NOTE — ED PROVIDER NOTES
History  Chief Complaint   Patient presents with    Asthma     Pt came to ER with asthma exacerbation. Pt used inhaler without relief. Patient is a 71-year-old male with a recent dx of asthma a few years ago presents with another asthma exacerbation. States dxd about 2 years ago. Was recently seen for asthma exacerbation here. States just given refill on albuterol vials. No steroids. Per review of chart patient was given steroids. Symptoms started again yesterday. Worse with exertion. No fever. +non productive cough. Smokes 4-5 cigs/day. Prior to Admission Medications   Prescriptions Last Dose Informant Patient Reported? Taking? Albuterol Sulfate (ProAir RespiClick) 069 (90 Base) MCG/ACT AEPB   Yes No   Sig: Inhale 180 mcg every 4 (four) hours   QUEtiapine (SEROquel) 100 mg tablet   Yes No   Sig: Take 100 mg by mouth daily at bedtime   acetaminophen (TYLENOL) 325 mg tablet   No No   Sig: Take 3 tablets (975 mg total) by mouth every 8 (eight) hours   Patient not taking: Reported on 3/19/2020   albuterol (2.5 mg/3 mL) 0.083 % nebulizer solution   No No   Sig: Take 3 mL (2.5 mg total) by nebulization every 4 (four) hours as needed for wheezing or shortness of breath   albuterol (2.5 mg/3 mL) 0.083 % nebulizer solution   No Yes   Sig: Take 3 mL (2.5 mg total) by nebulization every 4 (four) hours as needed for wheezing or shortness of breath   clonazePAM (KlonoPIN) 0.5 mg tablet   Yes No   Sig: Take 0.5 mg by mouth daily at bedtime   escitalopram (LEXAPRO) 20 mg tablet   Yes No   Sig: every 24 hours   fluticasone-salmeterol (Advair Diskus) 100-50 mcg/dose inhaler   No No   Sig: Inhale 1 puff 2 (two) times a day Rinse mouth after use.    ipratropium-albuterol (DUO-NEB) 0.5-2.5 mg/3 mL nebulizer solution   No No   Sig: TAKE 3 ML BY NEBULIZATION 4 (FOUR) TIMES A DAY, STOCK SIZE 90ML   phenytoin extended (DILANTIN) 300 MG ER capsule   Yes No   Sig: Take 300 mg by mouth 2 (two) times a day   Patient not taking: Reported on 11/23/2023   prazosin (MINIPRESS) 2 mg capsule   Yes No   Sig: Take 2 mg by mouth 2 (two) times a day      Facility-Administered Medications: None       Past Medical History:   Diagnosis Date    Bipolar 1 disorder ([address])     Myalgia 4/17/2019    Schizophrenia ([address])     Seizures ([address])     Subdural hematoma ([address]) 3/26/2019    Traumatic brain injury ([address]) 3/27/2019       Past Surgical History:   Procedure Laterality Date    ROTATOR CUFF REPAIR         History reviewed. No pertinent family history. I have reviewed and agree with the history as documented. E-Cigarette/Vaping    E-Cigarette Use Never User      E-Cigarette/Vaping Substances     Social History     Tobacco Use    Smoking status: Every Day     Packs/day: 0.25     Types: Cigarettes    Smokeless tobacco: Current   Vaping Use    Vaping Use: Never used   Substance Use Topics    Alcohol use: Not Currently    Drug use: Yes     Types: Marijuana, Amphetamines     Comment: Meth use a couple days ago       Review of Systems   Constitutional: Negative. HENT: Negative. Eyes: Negative. Respiratory:  Positive for cough and shortness of breath. Cardiovascular: Negative. Gastrointestinal: Negative. Endocrine: Negative. Genitourinary: Negative. Musculoskeletal: Negative. Skin: Negative. Allergic/Immunologic: Negative. Neurological: Negative. Hematological: Negative. Psychiatric/Behavioral: Negative. All other systems reviewed and are negative. Physical Exam  Physical Exam  Vitals and nursing note reviewed. Constitutional:       Appearance: Normal appearance. He is normal weight. HENT:      Head: Normocephalic and atraumatic. Nose: Nose normal.      Mouth/Throat:      Mouth: Mucous membranes are moist.      Pharynx: Oropharynx is clear. Eyes:      Conjunctiva/sclera: Conjunctivae normal.      Pupils: Pupils are equal, round, and reactive to light.    Cardiovascular:      Rate and Rhythm: Regular rhythm. Tachycardia present. Pulses: Normal pulses. Heart sounds: Normal heart sounds. Pulmonary:      Breath sounds: Wheezing present. Comments: Diffuse exp wheezing in all fields. Abdominal:      General: Bowel sounds are normal.      Palpations: Abdomen is soft. Musculoskeletal:         General: No swelling or tenderness. Normal range of motion. Cervical back: Normal range of motion and neck supple. Right lower leg: No edema. Left lower leg: No edema. Skin:     General: Skin is warm and dry. Neurological:      General: No focal deficit present. Mental Status: He is alert and oriented to person, place, and time.          Vital Signs  ED Triage Vitals   Temperature Pulse Respirations Blood Pressure SpO2   12/07/23 1940 12/07/23 1924 12/07/23 1924 12/07/23 1924 12/07/23 1924   98.9 °F (37.2 °C) (!) 132 22 120/91 (!) 89 %      Temp Source Heart Rate Source Patient Position - Orthostatic VS BP Location FiO2 (%)   12/07/23 1940 12/07/23 1924 -- -- --   Oral Monitor         Pain Score       --                  Vitals:    12/07/23 1924   BP: 120/91   Pulse: (!) 132         Visual Acuity      ED Medications  Medications   albuterol inhalation solution 10 mg (10 mg Nebulization Given 12/7/23 2004)   ipratropium (ATROVENT) 0.02 % inhalation solution 1 mg (1 mg Nebulization Given 12/7/23 2004)   sodium chloride 0.9 % inhalation solution 12 mL (12 mL Nebulization Given 12/7/23 2005)   methylPREDNISolone sodium succinate (Solu-MEDROL) injection 80 mg (80 mg Intravenous Given 12/7/23 1959)       Diagnostic Studies  Results Reviewed       Procedure Component Value Units Date/Time    Manual Differential(PHLEBS Do Not Order) [[id number]]  (Abnormal) Collected: 12/07/23 1956    Lab Status: Final result Specimen: Blood from Arm, Right Updated: 12/07/23 2104     Segmented % 58 %      Lymphocytes % 20 %      Monocytes % 6 %      Eosinophils, % 15 %      Basophils % 1 %      Absolute Neutrophils 6.41 Thousand/uL      Lymphocytes Absolute 2.21 Thousand/uL      Monocytes Absolute 0.66 Thousand/uL      Eosinophils Absolute 1.66 Thousand/uL      Basophils Absolute 0.11 Thousand/uL      Total Counted --     RBC Morphology Normal     Platelet Estimate Adequate     Giant PLTs Present    Procalcitonin [[id number]]  (Normal) Collected: 12/07/23 1956    Lab Status: Final result Specimen: Blood from Arm, Right Updated: 12/07/23 2036     Procalcitonin 0.07 ng/ml     Comprehensive metabolic panel [[id number]]  (Abnormal) Collected: 12/07/23 1956    Lab Status: Final result Specimen: Blood from Arm, Right Updated: 12/07/23 2027     Sodium 140 mmol/L      Potassium 3.7 mmol/L      Chloride 103 mmol/L      CO2 28 mmol/L      ANION GAP 9 mmol/L      BUN 13 mg/dL      Creatinine 1.10 mg/dL      Glucose 148 mg/dL      Calcium 9.4 mg/dL      AST 23 U/L      ALT 20 U/L      Alkaline Phosphatase 114 U/L      Total Protein 6.9 g/dL      Albumin 4.1 g/dL      Total Bilirubin 0.32 mg/dL      eGFR 79 ml/min/1.73sq m     Narrative:      [place] guidelines for Chronic Kidney Disease (CKD):     Stage 1 with normal or high GFR (GFR > 90 mL/min/1.73 square meters)    Stage 2 Mild CKD (GFR = 60-89 mL/min/1.73 square meters)    Stage 3A Moderate CKD (GFR = 45-59 mL/min/1.73 square meters)    Stage 3B Moderate CKD (GFR = 30-44 mL/min/1.73 square meters)    Stage 4 Severe CKD (GFR = 15-29 mL/min/1.73 square meters)    Stage 5 End Stage CKD (GFR <15 mL/min/1.73 square meters)  Note: GFR calculation is accurate only with a steady state creatinine    Lactic acid [[id number]]  (Normal) Collected: 12/07/23 1956    Lab Status: Final result Specimen: Blood from Arm, Right Updated: 12/07/23 2025     LACTIC ACID 1.3 mmol/L     Narrative:      Result may be elevated if tourniquet was used during collection.     Protime-INR [[id number]]  (Abnormal) Collected: 12/07/23 1956    Lab Status: Final result Specimen: Blood from Arm, Right Updated: 12/07/23 2024     Protime 14.7 seconds      INR 1.12    APTT [[id number]]  (Normal) Collected: 12/07/23 1956    Lab Status: Final result Specimen: Blood from Arm, Right Updated: 12/07/23 2024     PTT 35 seconds     Blood culture #1 [[id number]] Collected: 12/07/23 2016    Lab Status: In process Specimen: Blood from Arm, Left Updated: 12/07/23 2022    CBC and differential [[id number]]  (Abnormal) Collected: 12/07/23 1956    Lab Status: Final result Specimen: Blood from Arm, Right Updated: 12/07/23 2020     WBC 11.06 Thousand/uL      RBC 5.05 Million/uL      Hemoglobin 13.5 g/dL      Hematocrit 42.1 %      MCV 83 fL      MCH 26.7 pg      MCHC 32.1 g/dL      RDW 13.4 %      MPV 10.0 fL      Platelets 590 Thousands/uL     Blood culture #2 [[id number]] Collected: 12/07/23 1956    Lab Status: In process Specimen: Blood from Arm, Right Updated: 12/07/23 2006                   XR chest portable   ED Interpretation by Carrington Talley MD (12/07 2024)   No infiltrate                 Procedures  Procedures         ED Course  ED Course as of 12/07/23 2141   Thu Dec 07, 2023   2024 WBC(!): 11.06   2024 On neb. Breathing improved. Sats 100%   2027 LACTIC ACID: 1.3   2133 Neb finished. Feeling improved. Still with some wheezing but improved. Needs vials. Will dc. Initial Sepsis Screening       Row Name 12/07/23 2027                Is the patient's history suggestive of a new or worsening infection? Yes (Proceed)  -[address]        Suspected source of infection pneumonia  -[facility]        Indicate SIRS criteria Tachycardia > 90 bpm;Tachypnea > 20 resp per min  -[facility]        Are two or more of the above signs & symptoms of infection both present and new to the patient? Yes (Proceed)  -[facility]        Assess for evidence of organ dysfunction: Are any of the below criteria present within 6 hours of suspected infection and SIRS criteria that are NOT considered to be chronic conditions?  -- User Key  (r) = Recorded By, (t) = Taken By, (c) = Cosigned By      [facility] [facility] Name Provider Type    Nitin Nobles MD Physician                                  Medical Decision Making  Problems Addressed:  Asthma exacerbation: acute illness or injury     Details: improved with neb here. recent visit for similar.  question at this age with recent dx per patient if more COPD than asthma. covered with abx.  has steroids. refilled abluterol vials. has inhaler. Amount and/or Complexity of Data Reviewed  External Data Reviewed: notes. Labs: ordered. Decision-making details documented in ED Course. Radiology: ordered and independent interpretation performed. Decision-making details documented in ED Course. ECG/medicine tests: ordered and independent interpretation performed. Decision-making details documented in ED Course. Risk  Prescription drug management. Disposition  Final diagnoses:   Asthma exacerbation     Time reflects when diagnosis was documented in both MDM as applicable and the Disposition within this note       Time User Action Codes Description Comment    12/7/2023  9:34 PM [facility] Add [E93.261] Asthma exacerbation     12/7/2023  9:34 PM [facility] Add [J45.901] Asthma with acute exacerbation           ED Disposition       ED Disposition   Discharge    Condition   Stable    Date/Time   Thu Dec 7, 2023  9:34 PM    [facility] discharge to home/self care.                    Follow-up Information       Follow up With Specialties Details Why [address], [facility], Nurse Practitioner   [address]  [address]  [address] [facility]              Patient's Medications   Discharge Prescriptions    AZITHROMYCIN (ZITHROMAX) 250 MG TABLET    Take 2 tablets today then 1 tablet daily x 4 days       Start Date: 12/7/2023 End Date: 12/11/2023       Order Dose: --       Quantity: 6 tablet    Refills: 0       No discharge procedures on file.     PDMP Review       None            ED Provider  Electronically Signed by             Carrington Talley MD  12/07/23 0547       Carrington Talley MD  12/07/23 6052

## 2023-12-10 LAB
BACTERIA BLD CULT: NORMAL
BACTERIA BLD CULT: NORMAL

## 2023-12-12 LAB
BACTERIA BLD CULT: NORMAL
BACTERIA BLD CULT: NORMAL

## 2023-12-23 ENCOUNTER — APPOINTMENT (EMERGENCY)
Dept: RADIOLOGY | Facility: HOSPITAL | Age: 48
End: 2023-12-23
Payer: COMMERCIAL

## 2023-12-23 ENCOUNTER — HOSPITAL ENCOUNTER (EMERGENCY)
Facility: HOSPITAL | Age: 48
Discharge: HOME | End: 2023-12-23
Attending: EMERGENCY MEDICINE
Payer: COMMERCIAL

## 2023-12-23 ENCOUNTER — APPOINTMENT (EMERGENCY)
Dept: CT IMAGING | Facility: HOSPITAL | Age: 48
End: 2023-12-23
Payer: COMMERCIAL

## 2023-12-23 VITALS
HEIGHT: 66 IN | WEIGHT: 169.75 LBS | OXYGEN SATURATION: 98 % | HEART RATE: 94 BPM | RESPIRATION RATE: 18 BRPM | BODY MASS INDEX: 27.28 KG/M2 | DIASTOLIC BLOOD PRESSURE: 85 MMHG | TEMPERATURE: 97.6 F | SYSTOLIC BLOOD PRESSURE: 131 MMHG

## 2023-12-23 DIAGNOSIS — F19.10 POLYSUBSTANCE ABUSE (HCC): ICD-10-CM

## 2023-12-23 DIAGNOSIS — R41.82 ALTERED MENTAL STATUS: Primary | ICD-10-CM

## 2023-12-23 DIAGNOSIS — G93.89 ENCEPHALOMALACIA: ICD-10-CM

## 2023-12-23 LAB
ALBUMIN SERPL BCP-MCNC: 3.7 G/DL (ref 3.5–5)
ALP SERPL-CCNC: 108 U/L (ref 34–104)
ALT SERPL W P-5'-P-CCNC: 24 U/L (ref 7–52)
AMPHETAMINES SERPL QL SCN: NEGATIVE
ANION GAP SERPL CALCULATED.3IONS-SCNC: 7 MMOL/L
APAP SERPL-MCNC: <2 UG/ML (ref 10–20)
AST SERPL W P-5'-P-CCNC: 20 U/L (ref 13–39)
ATRIAL RATE: 97 BPM
BARBITURATES UR QL: NEGATIVE
BASOPHILS # BLD AUTO: 0.06 THOUSANDS/ÂΜL (ref 0–0.1)
BASOPHILS NFR BLD AUTO: 1 % (ref 0–1)
BENZODIAZ UR QL: POSITIVE
BILIRUB SERPL-MCNC: 0.19 MG/DL (ref 0.2–1)
BILIRUB UR QL STRIP: NEGATIVE
BUN SERPL-MCNC: 15 MG/DL (ref 5–25)
CALCIUM SERPL-MCNC: 9.2 MG/DL (ref 8.4–10.2)
CHLORIDE SERPL-SCNC: 105 MMOL/L (ref 96–108)
CLARITY UR: CLEAR
CO2 SERPL-SCNC: 24 MMOL/L (ref 21–32)
COCAINE UR QL: NEGATIVE
COLOR UR: YELLOW
CREAT SERPL-MCNC: 1.04 MG/DL (ref 0.6–1.3)
EOSINOPHIL # BLD AUTO: 0.81 THOUSAND/ÂΜL (ref 0–0.61)
EOSINOPHIL NFR BLD AUTO: 10 % (ref 0–6)
ERYTHROCYTE [DISTWIDTH] IN BLOOD BY AUTOMATED COUNT: 13.4 % (ref 11.6–15.1)
ETHANOL SERPL-MCNC: <10 MG/DL
GFR SERPL CREATININE-BSD FRML MDRD: 85 ML/MIN/1.73SQ M
GLUCOSE SERPL-MCNC: 118 MG/DL (ref 65–140)
GLUCOSE UR STRIP-MCNC: NEGATIVE MG/DL
HCT VFR BLD AUTO: 41.8 % (ref 36.5–49.3)
HGB BLD-MCNC: 13.4 G/DL (ref 12–17)
HGB UR QL STRIP.AUTO: NEGATIVE
IMM GRANULOCYTES # BLD AUTO: 0.04 THOUSAND/UL (ref 0–0.2)
IMM GRANULOCYTES NFR BLD AUTO: 1 % (ref 0–2)
KETONES UR STRIP-MCNC: NEGATIVE MG/DL
LEUKOCYTE ESTERASE UR QL STRIP: NEGATIVE
LYMPHOCYTES # BLD AUTO: 1.81 THOUSANDS/ÂΜL (ref 0.6–4.47)
LYMPHOCYTES NFR BLD AUTO: 22 % (ref 14–44)
MCH RBC QN AUTO: 26.8 PG (ref 26.8–34.3)
MCHC RBC AUTO-ENTMCNC: 32.1 G/DL (ref 31.4–37.4)
MCV RBC AUTO: 84 FL (ref 82–98)
MONOCYTES # BLD AUTO: 0.53 THOUSAND/ÂΜL (ref 0.17–1.22)
MONOCYTES NFR BLD AUTO: 6 % (ref 4–12)
NEUTROPHILS # BLD AUTO: 5.05 THOUSANDS/ÂΜL (ref 1.85–7.62)
NEUTS SEG NFR BLD AUTO: 60 % (ref 43–75)
NITRITE UR QL STRIP: NEGATIVE
NRBC BLD AUTO-RTO: 0 /100 WBCS
OPIATES UR QL SCN: POSITIVE
OXYCODONE+OXYMORPHONE UR QL SCN: NEGATIVE
P AXIS: 67 DEGREES
PCP UR QL: NEGATIVE
PH UR STRIP.AUTO: 6 [PH] (ref 4.5–8)
PLATELET # BLD AUTO: 343 THOUSANDS/UL (ref 149–390)
PMV BLD AUTO: 10.4 FL (ref 8.9–12.7)
POTASSIUM SERPL-SCNC: 4.3 MMOL/L (ref 3.5–5.3)
PR INTERVAL: 150 MS
PROT SERPL-MCNC: 6.2 G/DL (ref 6.4–8.4)
PROT UR STRIP-MCNC: NEGATIVE MG/DL
QRS AXIS: 71 DEGREES
QRSD INTERVAL: 88 MS
QT INTERVAL: 338 MS
QTC INTERVAL: 429 MS
RBC # BLD AUTO: 5 MILLION/UL (ref 3.88–5.62)
SALICYLATES SERPL-MCNC: <5 MG/DL (ref 3–20)
SODIUM SERPL-SCNC: 136 MMOL/L (ref 135–147)
SP GR UR STRIP.AUTO: 1.01 (ref 1–1.03)
T WAVE AXIS: 63 DEGREES
THC UR QL: POSITIVE
UROBILINOGEN UR QL STRIP.AUTO: 0.2 E.U./DL
VENTRICULAR RATE: 97 BPM
WBC # BLD AUTO: 8.3 THOUSAND/UL (ref 4.31–10.16)

## 2023-12-23 PROCEDURE — 80179 DRUG ASSAY SALICYLATE: CPT | Performed by: PHYSICIAN ASSISTANT

## 2023-12-23 PROCEDURE — 80143 DRUG ASSAY ACETAMINOPHEN: CPT | Performed by: PHYSICIAN ASSISTANT

## 2023-12-23 PROCEDURE — 70450 CT HEAD/BRAIN W/O DYE: CPT

## 2023-12-23 PROCEDURE — 96361 HYDRATE IV INFUSION ADD-ON: CPT

## 2023-12-23 PROCEDURE — 36415 COLL VENOUS BLD VENIPUNCTURE: CPT | Performed by: PHYSICIAN ASSISTANT

## 2023-12-23 PROCEDURE — 96360 HYDRATION IV INFUSION INIT: CPT

## 2023-12-23 PROCEDURE — 99285 EMERGENCY DEPT VISIT HI MDM: CPT | Performed by: PHYSICIAN ASSISTANT

## 2023-12-23 PROCEDURE — 80307 DRUG TEST PRSMV CHEM ANLYZR: CPT | Performed by: PHYSICIAN ASSISTANT

## 2023-12-23 PROCEDURE — 85025 COMPLETE CBC W/AUTO DIFF WBC: CPT | Performed by: PHYSICIAN ASSISTANT

## 2023-12-23 PROCEDURE — 82077 ASSAY SPEC XCP UR&BREATH IA: CPT | Performed by: PHYSICIAN ASSISTANT

## 2023-12-23 PROCEDURE — G1004 CDSM NDSC: HCPCS

## 2023-12-23 PROCEDURE — 73130 X-RAY EXAM OF HAND: CPT

## 2023-12-23 PROCEDURE — 73564 X-RAY EXAM KNEE 4 OR MORE: CPT

## 2023-12-23 PROCEDURE — 93005 ELECTROCARDIOGRAM TRACING: CPT

## 2023-12-23 PROCEDURE — 81003 URINALYSIS AUTO W/O SCOPE: CPT

## 2023-12-23 PROCEDURE — 80053 COMPREHEN METABOLIC PANEL: CPT | Performed by: PHYSICIAN ASSISTANT

## 2023-12-23 PROCEDURE — 99285 EMERGENCY DEPT VISIT HI MDM: CPT

## 2023-12-23 RX ORDER — MIDAZOLAM HYDROCHLORIDE 1 MG/ML
3 INJECTION INTRAMUSCULAR; INTRAVENOUS ONCE
Status: COMPLETED | OUTPATIENT
Start: 2023-12-23 | End: 2023-12-23

## 2023-12-23 RX ADMIN — SODIUM CHLORIDE 1000 ML: 0.9 INJECTION, SOLUTION INTRAVENOUS at 11:32
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
## 2023-12-23 NOTE — DISCHARGE INSTRUCTIONS
DISCHARGE INSTRUCTIONS:    FOLLOW UP WITH YOUR PRIMARY CARE PROVIDER OR THE [facility]. MAKE AN APPOINTMENT TO BE SEEN.     IF SYMPTOMS WORSEN OR NEW SYMPTOMS ARISE, RETURN TO THE ER TO BE SEEN.
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
## 2023-12-23 NOTE — ED NOTES
Patient vital signs cycling q15 minutes, placed on oxygen at 3L nasal cannula, 1L NSS bolus strarted via verbal order from SABI Brady RN  12/23/23 8026

## 2023-12-23 NOTE — ED NOTES
[facility] RN  12/23/23 5752